# Patient Record
Sex: FEMALE | Race: WHITE | NOT HISPANIC OR LATINO | Employment: FULL TIME | URBAN - METROPOLITAN AREA
[De-identification: names, ages, dates, MRNs, and addresses within clinical notes are randomized per-mention and may not be internally consistent; named-entity substitution may affect disease eponyms.]

---

## 2023-07-23 ENCOUNTER — HOSPITAL ENCOUNTER (INPATIENT)
Facility: HOSPITAL | Age: 38
LOS: 1 days | Discharge: HOME | End: 2023-07-24
Attending: FAMILY MEDICINE | Admitting: INTERNAL MEDICINE
Payer: COMMERCIAL

## 2023-07-23 ENCOUNTER — APPOINTMENT (EMERGENCY)
Dept: RADIOLOGY | Facility: HOSPITAL | Age: 38
End: 2023-07-23
Payer: COMMERCIAL

## 2023-07-23 DIAGNOSIS — R79.89 LOW TSH LEVEL: ICD-10-CM

## 2023-07-23 DIAGNOSIS — R00.2 PALPITATION: ICD-10-CM

## 2023-07-23 DIAGNOSIS — I49.3 VENTRICULAR ECTOPY: Primary | ICD-10-CM

## 2023-07-23 DIAGNOSIS — I10 HYPERTENSION: ICD-10-CM

## 2023-07-23 PROBLEM — I47.29 NSVT (NONSUSTAINED VENTRICULAR TACHYCARDIA) (HCC): Status: ACTIVE | Noted: 2023-07-23

## 2023-07-23 LAB
2HR DELTA HS TROPONIN: 0 NG/L
4HR DELTA HS TROPONIN: 1 NG/L
ANION GAP SERPL CALCULATED.3IONS-SCNC: 9 MMOL/L
ATRIAL RATE: 95 BPM
BASOPHILS # BLD AUTO: 0.01 THOUSANDS/ÂΜL (ref 0–0.1)
BASOPHILS NFR BLD AUTO: 0 % (ref 0–1)
BUN SERPL-MCNC: 7 MG/DL (ref 5–25)
CALCIUM SERPL-MCNC: 9.4 MG/DL (ref 8.4–10.2)
CARDIAC TROPONIN I PNL SERPL HS: 4 NG/L
CARDIAC TROPONIN I PNL SERPL HS: 4 NG/L
CARDIAC TROPONIN I PNL SERPL HS: 5 NG/L
CHLORIDE SERPL-SCNC: 104 MMOL/L (ref 96–108)
CO2 SERPL-SCNC: 23 MMOL/L (ref 21–32)
CREAT SERPL-MCNC: 0.49 MG/DL (ref 0.6–1.3)
D DIMER PPP FEU-MCNC: <0.27 UG/ML FEU
EOSINOPHIL # BLD AUTO: 0.03 THOUSAND/ÂΜL (ref 0–0.61)
EOSINOPHIL NFR BLD AUTO: 0 % (ref 0–6)
ERYTHROCYTE [DISTWIDTH] IN BLOOD BY AUTOMATED COUNT: 12.5 % (ref 11.6–15.1)
GFR SERPL CREATININE-BSD FRML MDRD: 123 ML/MIN/1.73SQ M
GLUCOSE SERPL-MCNC: 120 MG/DL (ref 65–140)
HCT VFR BLD AUTO: 43.8 % (ref 34.8–46.1)
HGB BLD-MCNC: 15 G/DL (ref 11.5–15.4)
IMM GRANULOCYTES # BLD AUTO: 0.01 THOUSAND/UL (ref 0–0.2)
IMM GRANULOCYTES NFR BLD AUTO: 0 % (ref 0–2)
LYMPHOCYTES # BLD AUTO: 1.86 THOUSANDS/ÂΜL (ref 0.6–4.47)
LYMPHOCYTES NFR BLD AUTO: 24 % (ref 14–44)
MAGNESIUM SERPL-MCNC: 2 MG/DL (ref 1.9–2.7)
MCH RBC QN AUTO: 25.8 PG (ref 26.8–34.3)
MCHC RBC AUTO-ENTMCNC: 34.2 G/DL (ref 31.4–37.4)
MCV RBC AUTO: 75 FL (ref 82–98)
MONOCYTES # BLD AUTO: 0.51 THOUSAND/ÂΜL (ref 0.17–1.22)
MONOCYTES NFR BLD AUTO: 7 % (ref 4–12)
NEUTROPHILS # BLD AUTO: 5.3 THOUSANDS/ÂΜL (ref 1.85–7.62)
NEUTS SEG NFR BLD AUTO: 69 % (ref 43–75)
NRBC BLD AUTO-RTO: 0 /100 WBCS
P AXIS: 68 DEGREES
PLATELET # BLD AUTO: 268 THOUSANDS/UL (ref 149–390)
PMV BLD AUTO: 9.4 FL (ref 8.9–12.7)
POTASSIUM SERPL-SCNC: 3.8 MMOL/L (ref 3.5–5.3)
PR INTERVAL: 150 MS
QRS AXIS: 86 DEGREES
QRSD INTERVAL: 96 MS
QT INTERVAL: 346 MS
QTC INTERVAL: 434 MS
RBC # BLD AUTO: 5.81 MILLION/UL (ref 3.81–5.12)
SODIUM SERPL-SCNC: 136 MMOL/L (ref 135–147)
T WAVE AXIS: 55 DEGREES
TSH SERPL DL<=0.05 MIU/L-ACNC: <0.01 UIU/ML (ref 0.45–4.5)
VENTRICULAR RATE: 95 BPM
WBC # BLD AUTO: 7.72 THOUSAND/UL (ref 4.31–10.16)

## 2023-07-23 PROCEDURE — 99284 EMERGENCY DEPT VISIT MOD MDM: CPT

## 2023-07-23 PROCEDURE — 99254 IP/OBS CNSLTJ NEW/EST MOD 60: CPT | Performed by: PHYSICIAN ASSISTANT

## 2023-07-23 PROCEDURE — 93005 ELECTROCARDIOGRAM TRACING: CPT

## 2023-07-23 PROCEDURE — 99223 1ST HOSP IP/OBS HIGH 75: CPT | Performed by: INTERNAL MEDICINE

## 2023-07-23 PROCEDURE — 96361 HYDRATE IV INFUSION ADD-ON: CPT

## 2023-07-23 PROCEDURE — 93010 ELECTROCARDIOGRAM REPORT: CPT | Performed by: INTERNAL MEDICINE

## 2023-07-23 PROCEDURE — 84443 ASSAY THYROID STIM HORMONE: CPT | Performed by: FAMILY MEDICINE

## 2023-07-23 PROCEDURE — 85025 COMPLETE CBC W/AUTO DIFF WBC: CPT | Performed by: FAMILY MEDICINE

## 2023-07-23 PROCEDURE — 71045 X-RAY EXAM CHEST 1 VIEW: CPT

## 2023-07-23 PROCEDURE — 36415 COLL VENOUS BLD VENIPUNCTURE: CPT | Performed by: FAMILY MEDICINE

## 2023-07-23 PROCEDURE — 96376 TX/PRO/DX INJ SAME DRUG ADON: CPT

## 2023-07-23 PROCEDURE — 85379 FIBRIN DEGRADATION QUANT: CPT | Performed by: FAMILY MEDICINE

## 2023-07-23 PROCEDURE — 99291 CRITICAL CARE FIRST HOUR: CPT | Performed by: FAMILY MEDICINE

## 2023-07-23 PROCEDURE — 96374 THER/PROPH/DIAG INJ IV PUSH: CPT

## 2023-07-23 PROCEDURE — 84484 ASSAY OF TROPONIN QUANT: CPT | Performed by: FAMILY MEDICINE

## 2023-07-23 PROCEDURE — 80048 BASIC METABOLIC PNL TOTAL CA: CPT | Performed by: FAMILY MEDICINE

## 2023-07-23 PROCEDURE — 83735 ASSAY OF MAGNESIUM: CPT | Performed by: FAMILY MEDICINE

## 2023-07-23 PROCEDURE — 84439 ASSAY OF FREE THYROXINE: CPT | Performed by: INTERNAL MEDICINE

## 2023-07-23 RX ORDER — ONDANSETRON 2 MG/ML
4 INJECTION INTRAMUSCULAR; INTRAVENOUS EVERY 6 HOURS PRN
Status: DISCONTINUED | OUTPATIENT
Start: 2023-07-23 | End: 2023-07-24 | Stop reason: HOSPADM

## 2023-07-23 RX ORDER — METOPROLOL TARTRATE 5 MG/5ML
5 INJECTION INTRAVENOUS EVERY 6 HOURS
Status: DISCONTINUED | OUTPATIENT
Start: 2023-07-23 | End: 2023-07-24

## 2023-07-23 RX ORDER — METOPROLOL TARTRATE 5 MG/5ML
5 INJECTION INTRAVENOUS
Status: COMPLETED | OUTPATIENT
Start: 2023-07-23 | End: 2023-07-23

## 2023-07-23 RX ORDER — ACETAMINOPHEN 325 MG/1
650 TABLET ORAL EVERY 6 HOURS PRN
Status: DISCONTINUED | OUTPATIENT
Start: 2023-07-23 | End: 2023-07-24 | Stop reason: HOSPADM

## 2023-07-23 RX ADMIN — METOROPROLOL TARTRATE 5 MG: 5 INJECTION, SOLUTION INTRAVENOUS at 22:09

## 2023-07-23 RX ADMIN — SODIUM CHLORIDE 1000 ML: 0.9 INJECTION, SOLUTION INTRAVENOUS at 10:34

## 2023-07-23 RX ADMIN — METOROPROLOL TARTRATE 5 MG: 5 INJECTION, SOLUTION INTRAVENOUS at 17:10

## 2023-07-23 RX ADMIN — METOPROLOL TARTRATE 5 MG: 5 INJECTION INTRAVENOUS at 10:41

## 2023-07-23 RX ADMIN — METOPROLOL TARTRATE 5 MG: 5 INJECTION INTRAVENOUS at 10:56

## 2023-07-23 RX ADMIN — METOPROLOL TARTRATE 5 MG: 5 INJECTION INTRAVENOUS at 11:17

## 2023-07-23 NOTE — PLAN OF CARE
Problem: CARDIOVASCULAR - ADULT  Goal: Maintains optimal cardiac output and hemodynamic stability  Description: INTERVENTIONS:  - Monitor I/O, vital signs and rhythm  - Monitor for S/S and trends of decreased cardiac output  - Administer and titrate ordered vasoactive medications to optimize hemodynamic stability  - Assess quality of pulses, skin color and temperature  - Assess for signs of decreased coronary artery perfusion  - Instruct patient to report change in severity of symptoms  Outcome: Progressing  Goal: Absence of cardiac dysrhythmias or at baseline rhythm  Description: INTERVENTIONS:  - Continuous cardiac monitoring, vital signs, obtain 12 lead EKG if ordered  - Administer antiarrhythmic and heart rate control medications as ordered  - Monitor electrolytes and administer replacement therapy as ordered  Outcome: Progressing     Problem: DISCHARGE PLANNING  Goal: Discharge to home or other facility with appropriate resources  Description: INTERVENTIONS:  - Identify barriers to discharge w/patient and caregiver  - Arrange for needed discharge resources and transportation as appropriate  - Identify discharge learning needs (meds, wound care, etc.)  - Arrange for interpretive services to assist at discharge as needed  - Refer to Case Management Department for coordinating discharge planning if the patient needs post-hospital services based on physician/advanced practitioner order or complex needs related to functional status, cognitive ability, or social support system  Outcome: Progressing     Problem: Knowledge Deficit  Goal: Patient/family/caregiver demonstrates understanding of disease process, treatment plan, medications, and discharge instructions  Description: Complete learning assessment and assess knowledge base.   Interventions:  - Provide teaching at level of understanding  - Provide teaching via preferred learning methods  Outcome: Progressing

## 2023-07-23 NOTE — H&P
[address]   H&P  Name: Moises Oliveira 43 y.o. female I MRN: [id number]  Unit/Bed#: -[facility] I Date of Admission: 7/23/2023   Date of Service: 7/23/2023 I Hospital Day: 0      Assessment/Plan   * NSVT (nonsustained ventricular tachycardia) ([address])  Assessment & Plan  · Patient with no cardiac history, NSVT noted on telemetry in the ER  · Also with ventricular ectopy/PVCs  · TSH level low, will follow-up free T4  · Magnesium level 2  · Cardiology input appreciated  · IV Lopressor initiated  · Patient denies any drug use  · Will check echo  · Follow-up with cardiology    Low TSH level  Assessment & Plan  · Free T4 pending  · Patient with no history of any thyroid disorders         VTE Prophylaxis: Low risk, encourage ambulation  Code Status: Full code  Discussion with family: Updated patient regarding plan of care    Anticipated Length of Stay:  Patient will be admitted on an Inpatient basis with an anticipated length of stay of  > 2 midnights. Justification for Hospital Stay: NSVT    Chief Complaint:   Anxiety    History of Present Illness:    Moises Oliveira is a 43 y.o. female who presents with panic attack patient states that she went to urgent care. Due to discomfort in her throat and was diagnosed with tracheitis. While at home last night patient became anxious about her diagnosis and began to look up information on the Internet which caused her to be more anxious and patient states she had a panic attack. Denies any chest pain or shortness of breath however did feel uneasy and symptoms continued through the night and patient presented this morning for further evaluation. Patient denies any alcohol or drug abuse. Does not take any medications. No fever or chills. No nausea or vomiting. Denies any recent changes in life that would lead to stress  In the ER patient noted to have NSVT and PVCs.   Case reviewed with cardiology who recommended admission for echo/stress test.  Metoprolol initiated in the ER by cardiology    Review of Systems:    Review of Systems   Constitutional: Positive for fatigue. Negative for chills and fever. Respiratory: Positive for cough. Cardiovascular: Positive for palpitations. Negative for chest pain. Psychiatric/Behavioral: The patient is nervous/anxious. All other systems reviewed and are negative. Past Medical and Surgical History:     History reviewed. No pertinent past medical history. Past Surgical History:   Procedure Laterality Date   •  SECTION         Meds/Allergies:    Prior to Admission medications    Not on File     all medications and allergies reviewed    Allergies: No Known Allergies    Social History:     Marital Status: Single   Patient Pre-hospital Living Situation: Lives with family  Patient Pre-hospital Level of Mobility: Ambulatory  Patient Pre-hospital Diet Restrictions: None  Substance Use History:   Social History     Substance and Sexual Activity   Alcohol Use None     Social History     Tobacco Use   Smoking Status Never   • Passive exposure: Never   Smokeless Tobacco Never     Social History     Substance and Sexual Activity   Drug Use Not on file       Family History:  I have reviewed the patient's family history    Physical Exam:     Vitals:   Blood Pressure: 145/98 (23 1317)  Pulse: 87 (23 1317)  Temperature: 98.3 °F (36.8 °C) (23 1317)  Temp Source: Tympanic (23 0751)  Respirations: 20 (23 1317)  Height: 5' 9" (175.3 cm) (23 1300)  Weight - Scale: 90.1 kg (198 lb 10.9 oz) (23 1300)  SpO2: 97 % (23 1317)    Physical Exam  Constitutional:       General: She is not in acute distress. HENT:      Head: Normocephalic and atraumatic. Nose: Nose normal.      Mouth/Throat:      Mouth: Mucous membranes are moist.   Eyes:      Extraocular Movements: Extraocular movements intact.       Conjunctiva/sclera: Conjunctivae normal.   Cardiovascular:      Rate and Rhythm: Normal rate and regular rhythm. Pulmonary:      Effort: Pulmonary effort is normal. No respiratory distress. Abdominal:      Palpations: Abdomen is soft. Tenderness: There is no abdominal tenderness. Musculoskeletal:         General: Normal range of motion. Cervical back: Normal range of motion and neck supple. Skin:     General: Skin is warm and dry. Neurological:      General: No focal deficit present. Mental Status: She is alert. Mental status is at baseline. Cranial Nerves: No cranial nerve deficit. Psychiatric:         Mood and Affect: Mood normal.         Behavior: Behavior normal.         Additional Data:     Lab Results: I have personally reviewed pertinent reports. Results from last 7 days   Lab Units 07/23/23  0845   WBC Thousand/uL 7.72   HEMOGLOBIN g/dL 15.0   HEMATOCRIT % 43.8   PLATELETS Thousands/uL 268   NEUTROS PCT % 69   LYMPHS PCT % 24   MONOS PCT % 7   EOS PCT % 0     Results from last 7 days   Lab Units 07/23/23  0845   SODIUM mmol/L 136   POTASSIUM mmol/L 3.8   CHLORIDE mmol/L 104   CO2 mmol/L 23   BUN mg/dL 7   CREATININE mg/dL 0.49*   ANION GAP mmol/L 9   CALCIUM mg/dL 9.4   GLUCOSE RANDOM mg/dL 120                       Imaging: I have personally reviewed pertinent reports. XR chest 1 view portable   Final Result by Marine Koyanagi, MD (07/23 1203)      No acute cardiopulmonary disease. Workstation performed: [id number]           [facility] / [facility] Everywhere Records Reviewed: Yes    ** Please Note: This note has been constructed using a voice recognition system.  **

## 2023-07-23 NOTE — ASSESSMENT & PLAN NOTE
Presented with anxious feeling and palpitations  Noted to have frequent V-ectopy on telemetry in the ED  Lopressor is started to control   Will admit patient to monitor on telemetry   Will check an echo to assess cardiac structures and LV function

## 2023-07-23 NOTE — ASSESSMENT & PLAN NOTE
· Patient with no cardiac history, NSVT noted on telemetry in the ER  · Also with ventricular ectopy/PVCs  · TSH level low, will follow-up free T4  · Magnesium level 2  · Cardiology input appreciated  · IV Lopressor initiated  · Patient denies any drug use  · Will check echo  · Follow-up with cardiology

## 2023-07-23 NOTE — PLAN OF CARE
Problem: PAIN - ADULT  Goal: Verbalizes/displays adequate comfort level or baseline comfort level  Description: Interventions:  - Encourage patient to monitor pain and request assistance  - Assess pain using appropriate pain scale  - Administer analgesics based on type and severity of pain and evaluate response  - Implement non-pharmacological measures as appropriate and evaluate response  - Consider cultural and social influences on pain and pain management  - Notify physician/advanced practitioner if interventions unsuccessful or patient reports new pain  Outcome: Progressing     Problem: SAFETY ADULT  Goal: Patient will remain free of falls  Description: INTERVENTIONS:  - Educate patient/family on patient safety including physical limitations  - Instruct patient to call for assistance with activity   - Consult OT/PT to assist with strengthening/mobility   - Keep Call bell within reach  - Keep bed low and locked with side rails adjusted as appropriate  - Keep care items and personal belongings within reach  - Initiate and maintain comfort rounds  - Make Fall Risk Sign visible to staff  - Offer Toileting every 2 Hours, in advance of need  - Initiate/Maintain bed alarm  - Obtain necessary fall risk management equipment: 2  - Apply yellow socks and bracelet for high fall risk patients  - Consider moving patient to room near nurses station  Outcome: Progressing  Goal: Maintain or return to baseline ADL function  Description: INTERVENTIONS:  -  Assess patient's ability to carry out ADLs; assess patient's baseline for ADL function and identify physical deficits which impact ability to perform ADLs (bathing, care of mouth/teeth, toileting, grooming, dressing, etc.)  - Assess/evaluate cause of self-care deficits   - Assess range of motion  - Assess patient's mobility; develop plan if impaired  - Assess patient's need for assistive devices and provide as appropriate  - Encourage maximum independence but intervene and supervise when necessary  - Involve family in performance of ADLs  - Assess for home care needs following discharge   - Consider OT consult to assist with ADL evaluation and planning for discharge  - Provide patient education as appropriate  Outcome: Progressing  Goal: Maintains/Returns to pre admission functional level  Description: INTERVENTIONS:  - Perform BMAT or MOVE assessment daily.   - Set and communicate daily mobility goal to care team and patient/family/caregiver. - Collaborate with rehabilitation services on mobility goals if consulted  - Perform Range of Motion 2 times a day. - Reposition patient every 2 hours. - Dangle patient 2 times a day  - Stand patient 2 times a day  - Ambulate patient 2 times a day  - Out of bed to chair 2 times a day   - Out of bed for meals 2 times a day  - Out of bed for toileting  - Record patient progress and toleration of activity level   Outcome: Progressing     Problem: DISCHARGE PLANNING  Goal: Discharge to home or other facility with appropriate resources  Description: INTERVENTIONS:  - Identify barriers to discharge w/patient and caregiver  - Arrange for needed discharge resources and transportation as appropriate  - Identify discharge learning needs (meds, wound care, etc.)  - Arrange for interpretive services to assist at discharge as needed  - Refer to Case Management Department for coordinating discharge planning if the patient needs post-hospital services based on physician/advanced practitioner order or complex needs related to functional status, cognitive ability, or social support system  Outcome: Progressing     Problem: Knowledge Deficit  Goal: Patient/family/caregiver demonstrates understanding of disease process, treatment plan, medications, and discharge instructions  Description: Complete learning assessment and assess knowledge base.   Interventions:  - Provide teaching at level of understanding  - Provide teaching via preferred learning methods  Outcome: Progressing     Problem: CARDIOVASCULAR - ADULT  Goal: Maintains optimal cardiac output and hemodynamic stability  Description: INTERVENTIONS:  - Monitor I/O, vital signs and rhythm  - Monitor for S/S and trends of decreased cardiac output  - Administer and titrate ordered vasoactive medications to optimize hemodynamic stability  - Assess quality of pulses, skin color and temperature  - Assess for signs of decreased coronary artery perfusion  - Instruct patient to report change in severity of symptoms  Outcome: Progressing  Goal: Absence of cardiac dysrhythmias or at baseline rhythm  Description: INTERVENTIONS:  - Continuous cardiac monitoring, vital signs, obtain 12 lead EKG if ordered  - Administer antiarrhythmic and heart rate control medications as ordered  - Monitor electrolytes and administer replacement therapy as ordered  Outcome: Progressing

## 2023-07-23 NOTE — ED PROVIDER NOTES
History  Chief Complaint   Patient presents with   • Medical Problem     Patient reporting anxiety and panic attacks over night. HPI  59-year-old female presented with the panic attack. Patient states that she was diagnosed with a tracheitis and started having panic attack thinking that she was going to die. Patient states the her she started having palpitation became really anxious so came to the ED with panic attack. Patient states she does not want any medication at this time she just wants to get checked. Patient was found to have elevated heart rate initially with elevated blood pressure. She denies any chest pain shortness of breath she is extremely anxious in the ED. None       History reviewed. No pertinent past medical history. Past Surgical History:   Procedure Laterality Date   •  SECTION         History reviewed. No pertinent family history. I have reviewed and agree with the history as documented. E-Cigarette/Vaping   • E-Cigarette Use Never User      E-Cigarette/Vaping Substances   • Nicotine No    • THC No    • CBD No    • Flavoring No    • Other No    • Unknown No      Social History     Tobacco Use   • Smoking status: Never     Passive exposure: Never   • Smokeless tobacco: Never   Vaping Use   • Vaping Use: Never used   Substance Use Topics   • Alcohol use: Never   • Drug use: Never       Review of Systems   Constitutional: Negative. HENT: Negative. Eyes: Negative. Respiratory: Negative. Cardiovascular: Negative. Gastrointestinal: Negative. Endocrine: Negative. Genitourinary: Negative. Musculoskeletal: Negative. Skin: Negative. Allergic/Immunologic: Negative. Neurological: Negative. Hematological: Negative. Psychiatric/Behavioral: The patient is nervous/anxious. Physical Exam  Physical Exam  Vitals and nursing note reviewed. Constitutional:       Appearance: She is well-developed.    HENT:      Head: Normocephalic and atraumatic. Right Ear: External ear normal.      Left Ear: External ear normal.      Nose: Nose normal.      Mouth/Throat:      Mouth: Mucous membranes are moist.      Pharynx: No oropharyngeal exudate. Eyes:      General: No scleral icterus. Right eye: No discharge. Left eye: No discharge. Conjunctiva/sclera: Conjunctivae normal.      Pupils: Pupils are equal, round, and reactive to light. Cardiovascular:      Rate and Rhythm: Normal rate and regular rhythm. Pulses: Normal pulses. Heart sounds: Normal heart sounds. Pulmonary:      Effort: Pulmonary effort is normal. No respiratory distress. Breath sounds: Normal breath sounds. No wheezing. Abdominal:      General: Bowel sounds are normal.      Palpations: Abdomen is soft. Musculoskeletal:         General: Normal range of motion. Cervical back: Normal range of motion and neck supple. Lymphadenopathy:      Cervical: No cervical adenopathy. Skin:     General: Skin is warm and dry. Capillary Refill: Capillary refill takes less than 2 seconds. Neurological:      General: No focal deficit present. Mental Status: She is alert and oriented to person, place, and time. Psychiatric:         Mood and Affect: Mood is anxious. Behavior: Behavior is agitated. Thought Content: Thought content is not paranoid or delusional. Thought content does not include homicidal or suicidal ideation. Thought content does not include homicidal or suicidal plan.          Vital Signs  ED Triage Vitals [07/23/23 0751]   Temperature Pulse Respirations Blood Pressure SpO2   (!) 97.3 °F (36.3 °C) (!) 120 16 167/77 98 %      Temp Source Heart Rate Source Patient Position - Orthostatic VS BP Location FiO2 (%)   Tympanic Monitor Sitting Left arm --      Pain Score       No Pain           Vitals:    07/23/23 1200 07/23/23 1215 07/23/23 1230 07/23/23 1317   BP: 164/73 152/65 152/69 145/98   Pulse: 85 94 83 87   Patient Position - Orthostatic VS:             Visual Acuity      ED Medications  Medications   metoprolol (LOPRESSOR) injection 5 mg (has no administration in time range)   acetaminophen (TYLENOL) tablet 650 mg (has no administration in time range)   ondansetron (ZOFRAN) injection 4 mg (has no administration in time range)   sodium chloride 0.9 % bolus 1,000 mL (0 mL Intravenous Stopped 7/23/23 1120)   metoprolol (LOPRESSOR) injection 5 mg (5 mg Intravenous Given 7/23/23 1117)       Diagnostic Studies  Results Reviewed     Procedure Component Value Units Date/Time    HS Troponin I 4hr [[id number]]  (Normal) Collected: 07/23/23 1255    Lab Status: Final result Specimen: Blood from Arm, Right Updated: 07/23/23 1326     hs TnI 4hr 5 ng/L      Delta 4hr hsTnI 1 ng/L     T4, free [[id number]] Collected: 07/23/23 0845    Lab Status:  In process Specimen: Blood from Arm, Right Updated: 07/23/23 1259    TSH [[id number]]  (Abnormal) Collected: 07/23/23 0845    Lab Status: Final result Specimen: Blood from Arm, Right Updated: 07/23/23 1158     TSH 3RD GENERATON <0.010 uIU/mL     HS Troponin I 2hr [[id number]]  (Normal) Collected: 07/23/23 1117    Lab Status: Final result Specimen: Blood from Arm, Right Updated: 07/23/23 1154     hs TnI 2hr 4 ng/L      Delta 2hr hsTnI 0 ng/L     Magnesium [[id number]]  (Normal) Collected: 07/23/23 0845    Lab Status: Final result Specimen: Blood from Arm, Right Updated: 07/23/23 1120     Magnesium 2.0 mg/dL     HS Troponin 0hr (reflex protocol) [[id number]]  (Normal) Collected: 07/23/23 0845    Lab Status: Final result Specimen: Blood from Arm, Right Updated: 07/23/23 0918     hs TnI 0hr 4 ng/L     D-Dimer [[id number]]  (Normal) Collected: 07/23/23 0845    Lab Status: Final result Specimen: Blood from Arm, Right Updated: 07/23/23 0913     D-Dimer, Quant <0.27 ug/ml FEU     Basic metabolic panel [[id number]]  (Abnormal) Collected: 07/23/23 7259    Lab Status: Final result Specimen: Blood from Arm, Right Updated: 07/23/23 0913     Sodium 136 mmol/L      Potassium 3.8 mmol/L      Chloride 104 mmol/L      CO2 23 mmol/L      ANION GAP 9 mmol/L      BUN 7 mg/dL      Creatinine 0.49 mg/dL      Glucose 120 mg/dL      Calcium 9.4 mg/dL      eGFR 123 ml/min/1.73sq m     Narrative:      [facility] guidelines for Chronic Kidney Disease (CKD):   •  Stage 1 with normal or high GFR (GFR > 90 mL/min/1.73 square meters)  •  Stage 2 Mild CKD (GFR = 60-89 mL/min/1.73 square meters)  •  Stage 3A Moderate CKD (GFR = 45-59 mL/min/1.73 square meters)  •  Stage 3B Moderate CKD (GFR = 30-44 mL/min/1.73 square meters)  •  Stage 4 Severe CKD (GFR = 15-29 mL/min/1.73 square meters)  •  Stage 5 End Stage CKD (GFR <15 mL/min/1.73 square meters)  Note: GFR calculation is accurate only with a steady state creatinine    CBC and differential [[id number]]  (Abnormal) Collected: 07/23/23 0845    Lab Status: Final result Specimen: Blood from Arm, Right Updated: 07/23/23 0901     WBC 7.72 Thousand/uL      RBC 5.81 Million/uL      Hemoglobin 15.0 g/dL      Hematocrit 43.8 %      MCV 75 fL      MCH 25.8 pg      MCHC 34.2 g/dL      RDW 12.5 %      MPV 9.4 fL      Platelets 026 Thousands/uL      nRBC 0 /100 WBCs      Neutrophils Relative 69 %      Immat GRANS % 0 %      Lymphocytes Relative 24 %      Monocytes Relative 7 %      Eosinophils Relative 0 %      Basophils Relative 0 %      Neutrophils Absolute 5.30 Thousands/µL      Immature Grans Absolute 0.01 Thousand/uL      Lymphocytes Absolute 1.86 Thousands/µL      Monocytes Absolute 0.51 Thousand/µL      Eosinophils Absolute 0.03 Thousand/µL      Basophils Absolute 0.01 Thousands/µL                  XR chest 1 view portable   Final Result by Alexey Alejandro MD (07/23 1203)      No acute cardiopulmonary disease.                Workstation performed: [id number]                    Procedures  CriticalCare Time    Date/Time: 7/23/2023 4:41 PM    Performed by: Ezequiel Hayes, MD  Authorized by: Judi Mckenzie MD    Critical care provider statement:     Critical care time (minutes):  75    Critical care start time:  7/23/2023 8:07 AM    Critical care end time:  7/23/2023 12:30 PM    Critical care time was exclusive of:  Separately billable procedures and treating other patients and teaching time    Critical care was necessary to treat or prevent imminent or life-threatening deterioration of the following conditions:  Cardiac failure    Critical care was time spent personally by me on the following activities:  Blood draw for specimens, obtaining history from patient or surrogate, development of treatment plan with patient or surrogate, discussions with consultants, discussions with primary provider, evaluation of patient's response to treatment, examination of patient, review of old charts, re-evaluation of patient's condition, ordering and review of radiographic studies, ordering and review of laboratory studies, ordering and performing treatments and interventions and interpretation of cardiac output measurements    I assumed direction of critical care for this patient from another provider in my specialty: no               ED Course  ED Course as of 07/23/23 1644   Sun Jul 23, 2023   0927 D-Dimer, Quant: <0.27  Negative dimer. 7445 hs TnI 0hr: 4  Discussed with patient and repeat 2-hour troponin patient does not have any chest pain or shortness of breath. 1000 Patient is is denying chest pain shortness of breath. States feeling much better. Multiple run of V. tach this was sent to cardiology team Elizabeth Vaca) on call. Waiting for callback. Medical Decision Making  39-year-old female presented to the ED with the complaint of a panic attack. Recent diagnosed with tracheitis which she states is improving. History is taken from patient and his significant other who is by bedside.   Given the patient current status will obtain lab including CBC BMP troponin D-dimer. Low suspicion for MI/PE/NSTEMI/pneumonia/arrhythmia patient was offered medication for anxiety which she refused states the I do not want any medication my ex  from the abusing drugs. Labs reviewed x-rays reviewed patient had multiple runs of ventricular tachycardia consulted to cardiology. Troponin admitted for repeat 2-hour troponin. Recommend to start patient on Lopressor admit we will obtain magnesium and TSH. Case discussed with the internal medicine team the allergy team will discuss his case with EP. Disposition admit to internal medicine team for further observation. Amount and/or Complexity of Data Reviewed  Labs: ordered. Decision-making details documented in ED Course. Radiology: ordered. Risk  Decision regarding hospitalization. Disposition  Final diagnoses:   Ventricular ectopy   Palpitation   Hypertension     Time reflects when diagnosis was documented in both MDM as applicable and the Disposition within this note     Time User Action Codes Description Comment    2023 12:24 PM Asad Arevalo Add [I49.3] Ventricular ectopy     2023 12:29 PM Asad Arevalo Add [R00.2] Palpitation     2023 12:30 PM Asad Arevalo Add [I10] Hypertension       ED Disposition     ED Disposition   Admit    Condition   Stable    Date/Time   Sun 2023 12:29 PM    Comment   Case was discussed with  Dr. Sandra Manley  and the patient's admission status was agreed to be Admission Status: inpatient status to the service of Dr. Sandra Manley . Follow-up Information    None         There are no discharge medications for this patient. No discharge procedures on file.     PDMP Review     None          ED Provider  Electronically Signed by           Asad Arevalo MD  23 [id number]

## 2023-07-23 NOTE — CONSULTS
[address]  Consult  Name: Fernando Elias 43 y.o. female I MRN: [id number]  Unit/Bed#: ED 27 I Date of Admission: 7/23/2023   Date of Service: 7/23/2023 I Hospital Day: 0    Consult to cardiology  Consult performed by: Sona Clayton PA-C  Consult ordered by: Cassie Johnson MD          Assessment/Plan   NSVT (nonsustained ventricular tachycardia) [facility])  Assessment & Plan  Multiple recurring episodes of NSVT noted on telemetry in the ED  Lopressor started to control   See additional comments  Monitor on telemetry   Will consult with EP to discuss further treatment options       Ventricular ectopy  Assessment & Plan  Presented with anxious feeling and palpitations  Noted to have frequent V-ectopy on telemetry in the ED  Lopressor is started to control   Will admit patient to monitor on telemetry   Will check an echo to assess cardiac structures and LV function          Summary Comments:  Recurrent ventricular ectopy and NSVT noted on telemetry. Lopressor will be administered to control. Continue monitoring on telemetry. Echocardiogram will be checked to assess cardiac structures and LV function. Thank you for allowing us to see this pleasant patient in consult. We will follow course along with you and make outpatient plans outlined above with all arrangements. Outpatient Cardiologist: None    Chief Complaint:  Chief Complaint   Patient presents with   • Medical Problem     Patient reporting anxiety and panic attacks over night. History of Present Illness: The patient is a 43-year-old female who has no significant past medical history. She comes into the emergency room complaining of "feeling anxious". The patient states that she had "one of her anxiety attacks". She noted that her heart was racing and pounding and she was not feeling right she came to the emergency room for further evaluation and treatment.   On telemetry she was noted to have frequent ventricular ectopy and recurrent NSVT. We were asked to comment with regard. EKG shows normal sinus rhythm with frequent PVCs. Review of Systems: a 10 point review of systems was conducted and is negative except for as mentioned in the HPI or as below. Review of Systems   Constitutional: Negative. HENT: Negative. Eyes: Negative. Cardiovascular: Positive for palpitations. Negative for chest pain, claudication, cyanosis, dyspnea on exertion, irregular heartbeat, leg swelling, near-syncope, orthopnea, paroxysmal nocturnal dyspnea and syncope. Respiratory: Negative. Negative for cough, hemoptysis, shortness of breath, sleep disturbances due to breathing, snoring, sputum production and wheezing. Endocrine: Negative. Hematologic/Lymphatic: Negative. Skin: Negative. Musculoskeletal: Negative. Gastrointestinal: Negative. Genitourinary: Negative. Neurological: Negative. Psychiatric/Behavioral: The patient is nervous/anxious. Allergic/Immunologic: Negative. Past Medical and Surgical History:  History reviewed. No pertinent past medical history. Past Surgical History:   Procedure Laterality Date   •  SECTION       Social History     Substance and Sexual Activity   Alcohol Use None     Social History     Substance and Sexual Activity   Drug Use Not on file     Social History     Tobacco Use   Smoking Status Never   • Passive exposure: Never   Smokeless Tobacco Never       Family History:  History reviewed. No pertinent family history. Medication:    Current Facility-Administered Medications:   •  metoprolol (LOPRESSOR) injection 5 mg, 5 mg, Intravenous, Q6H, Louis Singh PA-C  No current outpatient medications on file. Allergies:  No Known Allergies    Physical Exam:  Vitals: Blood pressure 153/73, pulse 83, temperature (!) 97.3 °F (36.3 °C), temperature source Tympanic, resp. rate (!) 23, SpO2 97 %. , There is no height or weight on file to calculate BMI.,   Orthostatic Blood Pressures    Flowsheet Row Most Recent Value   Blood Pressure 153/73 filed at 07/23/2023 1130   Patient Position - Orthostatic VS Sitting filed at 07/23/2023 4516      , Systolic (66KOH), LMA:273 , Min:153 , XYY:908   , Diastolic (62SVG), JPN:77, Min:73, Max:88    Physical Exam  Vitals and nursing note reviewed. Constitutional:       Appearance: She is well-developed. HENT:      Head: Normocephalic and atraumatic. Mouth/Throat:      Mouth: Mucous membranes are moist.   Eyes:      General: No scleral icterus. Conjunctiva/sclera: Conjunctivae normal.   Neck:      Vascular: No JVD. Trachea: No tracheal deviation. Cardiovascular:      Rate and Rhythm: Normal rate. Rhythm irregular. Pulses: Intact distal pulses. Heart sounds: Normal heart sounds, S1 normal and S2 normal. No murmur heard. No friction rub. No gallop. Pulmonary:      Effort: Pulmonary effort is normal. No respiratory distress. Breath sounds: Normal breath sounds. No wheezing or rales. Chest:      Chest wall: No tenderness. Abdominal:      General: Bowel sounds are normal. There is no distension. Palpations: Abdomen is soft. Tenderness: There is no abdominal tenderness. Comments: Aorta not palpable    Musculoskeletal:         General: No tenderness. Normal range of motion. Cervical back: Normal range of motion and neck supple. Right lower leg: No edema. Left lower leg: No edema. Skin:     General: Skin is warm and dry. Coloration: Skin is not pale. Findings: No erythema or rash. Neurological:      General: No focal deficit present. Mental Status: She is alert and oriented to person, place, and time.    Psychiatric:         Mood and Affect: Mood normal.         Behavior: Behavior normal.         Judgment: Judgment normal.           Most Recent Cardiac Imaging:   Echo is ordered    EKG/Telemetry: Sinus tachycardia with frequent ventricular ectopy with runs of NSVT    7/23/2023 sinus rhythm with frequent PVCs    Lab Results:   Troponins:   Results from last 7 days   Lab Units 07/23/23  1117 07/23/23  0845   HS TNI 0HR ng/L  --  4   HS TNI 2HR ng/L 4  --    HSTNI D2 ng/L 0  --       BNP:    CBC with diff:   Results from last 7 days   Lab Units 07/23/23  0845   WBC Thousand/uL 7.72   HEMOGLOBIN g/dL 15.0   HEMATOCRIT % 43.8   PLATELETS Thousands/uL 268   RBC Million/uL 5.81*     CMP:  Results from last 7 days   Lab Units 07/23/23  0845   POTASSIUM mmol/L 3.8   CHLORIDE mmol/L 104   BUN mg/dL 7   CREATININE mg/dL 0.49*   CALCIUM mg/dL 9.4   EGFR ml/min/1.73sq m 123     Lipid Profile:

## 2023-07-23 NOTE — ASSESSMENT & PLAN NOTE
Multiple recurring episodes of NSVT noted on telemetry in the ED  Lopressor started to control   See additional comments  Monitor on telemetry   Will consult with EP to discuss further treatment options

## 2023-07-24 ENCOUNTER — APPOINTMENT (OUTPATIENT)
Dept: NON INVASIVE DIAGNOSTICS | Facility: HOSPITAL | Age: 38
End: 2023-07-24
Payer: COMMERCIAL

## 2023-07-24 VITALS
SYSTOLIC BLOOD PRESSURE: 125 MMHG | DIASTOLIC BLOOD PRESSURE: 76 MMHG | HEART RATE: 64 BPM | OXYGEN SATURATION: 98 % | WEIGHT: 198 LBS | BODY MASS INDEX: 29.33 KG/M2 | TEMPERATURE: 98.3 F | RESPIRATION RATE: 19 BRPM | HEIGHT: 69 IN

## 2023-07-24 PROBLEM — E05.90 HYPERTHYROIDISM: Status: ACTIVE | Noted: 2023-07-23

## 2023-07-24 PROBLEM — R00.2 PALPITATIONS: Status: ACTIVE | Noted: 2023-07-24

## 2023-07-24 PROBLEM — I49.3 VENTRICULAR ECTOPY: Status: RESOLVED | Noted: 2023-07-23 | Resolved: 2023-07-24

## 2023-07-24 PROBLEM — F41.9 ANXIETY: Status: ACTIVE | Noted: 2023-07-24

## 2023-07-24 LAB
AORTIC ROOT: 2.4 CM
AORTIC VALVE MEAN VELOCITY: 13.4 M/S
APICAL FOUR CHAMBER EJECTION FRACTION: 62 %
AV AREA BY CONTINUOUS VTI: 1.8 CM2
AV AREA PEAK VELOCITY: 1.8 CM2
AV LVOT MEAN GRADIENT: 3 MMHG
AV LVOT PEAK GRADIENT: 5 MMHG
AV MEAN GRADIENT: 8 MMHG
AV PEAK GRADIENT: 16 MMHG
AV VALVE AREA: 1.82 CM2
AV VELOCITY RATIO: 0.56
DOP CALC AO PEAK VEL: 1.98 M/S
DOP CALC AO VTI: 36.84 CM
DOP CALC LVOT AREA: 3.14 CM2
DOP CALC LVOT CARDIAC INDEX: 2.91 L/MIN/M2
DOP CALC LVOT CARDIAC OUTPUT: 6 L/MIN
DOP CALC LVOT DIAMETER: 2 CM
DOP CALC LVOT PEAK VEL VTI: 21.33 CM
DOP CALC LVOT PEAK VEL: 1.11 M/S
DOP CALC LVOT STROKE INDEX: 31.1 ML/M2
DOP CALC LVOT STROKE VOLUME: 66.98 CM3
FRACTIONAL SHORTENING: 35 % (ref 28–44)
INTERVENTRICULAR SEPTUM IN DIASTOLE (PARASTERNAL SHORT AXIS VIEW): 0.8 CM
INTERVENTRICULAR SEPTUM: 0.8 CM (ref 0.6–1.1)
LAAS-AP4: 15.2 CM2
LEFT ATRIUM SIZE: 3.6 CM
LEFT INTERNAL DIMENSION IN SYSTOLE: 3.6 CM (ref 2.1–4)
LEFT VENTRICULAR INTERNAL DIMENSION IN DIASTOLE: 5.5 CM (ref 3.5–6)
LEFT VENTRICULAR POSTERIOR WALL IN END DIASTOLE: 0.7 CM
LEFT VENTRICULAR STROKE VOLUME: 93 ML
LVSV (TEICH): 93 ML
MAX HR PERCENT: 85 %
MAX HR: 155 BPM
RATE PRESSURE PRODUCT: NORMAL
RIGHT ATRIUM AREA SYSTOLE A4C: 13.8 CM2
RIGHT VENTRICLE ID DIMENSION: 3.2 CM
SL CV PED ECHO LEFT VENTRICLE DIASTOLIC VOLUME (MOD BIPLANE) 2D: 147 ML
SL CV PED ECHO LEFT VENTRICLE SYSTOLIC VOLUME (MOD BIPLANE) 2D: 54 ML
SL CV STRESS RECOVERY BP: NORMAL MMHG
SL CV STRESS RECOVERY HR: 88 BPM
SL CV STRESS RECOVERY O2 SAT: 99 %
SL CV STRESS STAGE REACHED: 3
STRESS ANGINA INDEX: 0
STRESS BASELINE BP: NORMAL MMHG
STRESS BASELINE HR: 92 BPM
STRESS O2 SAT REST: 99 %
STRESS PEAK HR: 153 BPM
STRESS POST ESTIMATED WORKLOAD: 7.5 METS
STRESS POST EXERCISE DUR MIN: 6 MIN
STRESS POST EXERCISE DUR SEC: 21 SEC
STRESS POST O2 SAT PEAK: 99 %
STRESS POST PEAK BP: 160 MMHG
T4 FREE SERPL-MCNC: 2.38 NG/DL (ref 0.61–1.12)
TR MAX PG: 33 MMHG
TR PEAK VELOCITY: 2.9 M/S
TRICUSPID ANNULAR PLANE SYSTOLIC EXCURSION: 2.9 CM
TRICUSPID VALVE PEAK REGURGITATION VELOCITY: 2.89 M/S

## 2023-07-24 PROCEDURE — 99232 SBSQ HOSP IP/OBS MODERATE 35: CPT | Performed by: HOSPITALIST

## 2023-07-24 PROCEDURE — 93350 STRESS TTE ONLY: CPT

## 2023-07-24 PROCEDURE — 93350 STRESS TTE ONLY: CPT | Performed by: INTERNAL MEDICINE

## 2023-07-24 PROCEDURE — 99254 IP/OBS CNSLTJ NEW/EST MOD 60: CPT | Performed by: STUDENT IN AN ORGANIZED HEALTH CARE EDUCATION/TRAINING PROGRAM

## 2023-07-24 PROCEDURE — 99232 SBSQ HOSP IP/OBS MODERATE 35: CPT | Performed by: INTERNAL MEDICINE

## 2023-07-24 PROCEDURE — 99239 HOSP IP/OBS DSCHRG MGMT >30: CPT | Performed by: HOSPITALIST

## 2023-07-24 RX ORDER — METOPROLOL TARTRATE 50 MG/1
50 TABLET, FILM COATED ORAL 2 TIMES DAILY
Status: DISCONTINUED | OUTPATIENT
Start: 2023-07-24 | End: 2023-07-24 | Stop reason: HOSPADM

## 2023-07-24 RX ORDER — METOPROLOL TARTRATE 50 MG/1
50 TABLET, FILM COATED ORAL 2 TIMES DAILY
Qty: 60 TABLET | Refills: 0 | Status: SHIPPED | OUTPATIENT
Start: 2023-07-24 | End: 2023-08-23

## 2023-07-24 RX ADMIN — METOROPROLOL TARTRATE 5 MG: 5 INJECTION, SOLUTION INTRAVENOUS at 05:36

## 2023-07-24 RX ADMIN — METOPROLOL TARTRATE 50 MG: 50 TABLET, FILM COATED ORAL at 10:31

## 2023-07-24 NOTE — DISCHARGE INSTR - AVS FIRST PAGE
Dear Alicia Gonzalez,     It was our pleasure to care for you here at [facility], [facility]. It is our hope that we were always able to exceed the expected standards for your care during your stay. You were hospitalized due to palpitations, and thyroiditis. You were cared for on the medical/surgical floor by Jennifer Sandoval MD with the [facility] who covers for your primary care physician (PCP), No primary care provider on file. , while you were hospitalized. You were additionally seen by Dr. Kiki Olson of the [facility]ology [facility], and by Dr. Mario Brito of endocrinology. If you have any questions or concerns related to this hospitalization, you may contact us at [phone]. For follow up as well as any medication refills, we recommend that you follow up with your primary care physician. A registered nurse will reach out to you by phone within a few days after your discharge to answer any additional questions that you may have after going home. However, at this time we provide for you here, the most important instructions / recommendations at discharge:     Notable Medication Adjustments -   New prescription metoprolol tartrate 50 mg-take 1 tablet twice a day  Testing Required after Discharge -   To be further determined in the outpatient setting by your primary care provider and/or cardiology/endocrinology  Important follow up information -   Please follow-up with the providers as outlined in this discharge package  Other Instructions -   Please maintain a healthy diet  Please review this entire after visit summary as additional general instructions including medication list, appointments, activity, diet, any pertinent wound care, and other additional recommendations from your care team that may be provided for you.       Sincerely,     Jennifer Sandoval MD

## 2023-07-24 NOTE — CASE MANAGEMENT
Case Management Discharge Planning Note    Patient name Alicia Gonzalez  Location /-01 MRN [id number]  : 1985 Date 2023       Current Admission Date: 2023  Current Admission Diagnosis:NSVT (nonsustained ventricular tachycardia) [facility])   Patient Active Problem List    Diagnosis Date Noted   • Ventricular ectopy 2023   • NSVT (nonsustained ventricular tachycardia) ([address]) 2023   • Low TSH level 2023      LOS (days): 1  Geometric Mean LOS (GMLOS) (days):   Days to GMLOS:     OBJECTIVE:  Risk of Unplanned Readmission Score: 6.17         Current admission status: Inpatient   Preferred Pharmacy:   [address] [place] [address] [id number] Cec[address] [address]  Phone: [phone] Fax: [phone]    Primary Care Provider: No primary care provider on file. Primary Insurance: [address] [place] [address]  Secondary Insurance:     DISCHARGE DETAILS:  Additional Comments: Chart reviewed for discharge planning. Pt IPTA. Pt has no anticipated CM discharge needs. Pt viral return home with family when stable. CM to follow.

## 2023-07-24 NOTE — UTILIZATION REVIEW
Initial Clinical Review    Admission: Date/Time/Statement:   Admission Orders (From admission, onward)     Ordered        07/23/23 1230  INPATIENT ADMISSION  Once                      Orders Placed This Encounter   Procedures   • INPATIENT ADMISSION     Standing Status:   Standing     Number of Occurrences:   1     Order Specific Question:   Level of Care     Answer:   Med Surg [16]     Order Specific Question:   Estimated length of stay     Answer:   More than 2 Midnights     Order Specific Question:   Certification     Answer:   I certify that inpatient services are medically necessary for this patient for a duration of greater than two midnights. See H&P and MD Progress Notes for additional information about the patient's course of treatment. ED Arrival Information     Expected   -    Arrival   7/23/2023 07:44    Acuity   Urgent            Means of arrival   Walk-In    Escorted by   Family Member    Service   Hospitalist    Admission type   Emergency            Arrival complaint   sore throat           Chief Complaint   Patient presents with   • Medical Problem     Patient reporting anxiety and panic attacks over night. Initial Presentation: 43 y.o. female to the ED from home with complaints of panic attack. REcently diagnosed with tracheitis. Admitted to inpatient for NSVT. ARrives anxious, tachycardic, agitated. EKG shows NSVT, PVCs. Cardiology consult. Given IV lopressor in the ED. Cardiology consult:  NSVT, ventricular ectopy on tele. Started on lopressor po. Check ECHO, monitor tele. Date: 7/24   Day 2: NSVT resolved. TSH level low. Consult endocrine. Cardiology consult: NSVT appears to represent right ventricular outflow tract origin. Continue bb. Plan for stress test, ECHo. PROCEDURE NOTE  STRESS ECHO  7/24   Stress ECG: Arrhythmias during recovery: occasional PVCs. No runs.  The stress ECG is negative for ischemia after maximal exercise, without reproduction of symptoms. •  Left Ventricle: Left ventricular cavity size is normal. Systolic function is normal. Wall motion is normal.  •  Peak Stress Echo: Left ventricle cavity has normal reduction in size at peak stress. The left ventricle systolic function is normal at peak stress. The peak stress echo showed normal wall motion. •  Echo Post Impression: The study is normal.     No evidence for right ventricular enlargement. No evidence for cardiomyopathy. PVCs at rest and with stress but no significant runs. No evidence for ischemia nor evidence for exercise worsened dysrhythmia.       ENdocrinology:  Hyperthyroidism. H/o recent URI with neck and throat discomfort.      ED Triage Vitals [07/23/23 0751]   Temperature Pulse Respirations Blood Pressure SpO2   (!) 97.3 °F (36.3 °C) (!) 120 16 167/77 98 %      Temp Source Heart Rate Source Patient Position - Orthostatic VS BP Location FiO2 (%)   Tympanic Monitor Sitting Left arm --      Pain Score       No Pain          Wt Readings from Last 1 Encounters:   07/23/23 90.1 kg (198 lb 10.9 oz)     Additional Vital Signs:   Date/Time Temp Pulse Resp BP MAP (mmHg) SpO2 O2 Device Patient Position - Orthostatic VS   07/24/23 07:18:37 98.2 °F (36.8 °C) 82 18 157/87 110 97 % -- --   07/24/23 05:37:13 -- 84 -- 130/90 103 98 % -- --   07/24/23 02:59:18 98.8 °F (37.1 °C) 89 18 180/85 Abnormal  117 98 % None (Room air) Lying   07/23/23 2300 98.7 °F (37.1 °C) 75 18 169/99 122 100 % None (Room air) Sitting   07/23/23 22:09:14 -- 91 -- 166/94 118 97 % -- --   07/23/23 20:02:18 -- 97 -- 156/100 119 97 % -- --   07/23/23 1900 98.4 °F (36.9 °C) 98 20 167/106 Abnormal  126 96 % None (Room air) Lying   07/23/23 1339 -- -- -- -- -- -- None (Room air) --   07/23/23 13:17:30 98.3 °F (36.8 °C) 87 20 145/98 114 97 % -- --   07/23/23 1230 -- 83 22 152/69 99 97 % -- --   07/23/23 1215 -- 94 23 Abnormal  152/65 93 96 % -- --   07/23/23 1200 -- 85 22 164/73 105 97 % -- --   07/23/23 1145 -- 80 22 166/72 103 97 % -- --   07/23/23 1130 -- 83 23 Abnormal  153/73 100 97 % -- --   07/23/23 1100 -- 85 29 Abnormal  177/88 Abnormal  126 97 %         Pertinent Labs/Diagnostic Test Results:   7/23 EKG: Sinus rhythm with frequent Premature ventricular complexes  Otherwise normal ECG  No previous ECGs available  XR chest 1 view portable   Final Result by Jayleen Swann MD (07/23 1203)      No acute cardiopulmonary disease.                Workstation performed: [id number]               Results from last 7 days   Lab Units 07/23/23  0845   WBC Thousand/uL 7.72   HEMOGLOBIN g/dL 15.0   HEMATOCRIT % 43.8   PLATELETS Thousands/uL 268   NEUTROS ABS Thousands/µL 5.30         Results from last 7 days   Lab Units 07/23/23  0845   SODIUM mmol/L 136   POTASSIUM mmol/L 3.8   CHLORIDE mmol/L 104   CO2 mmol/L 23   ANION GAP mmol/L 9   BUN mg/dL 7   CREATININE mg/dL 0.49*   EGFR ml/min/1.73sq m 123   CALCIUM mg/dL 9.4   MAGNESIUM mg/dL 2.0             Results from last 7 days   Lab Units 07/23/23  0845   GLUCOSE RANDOM mg/dL 120         Results from last 7 days   Lab Units 07/23/23  1255 07/23/23  1117 07/23/23  0845   HS TNI 0HR ng/L  --   --  4   HS TNI 2HR ng/L  --  4  --    HSTNI D2 ng/L  --  0  --    HS TNI 4HR ng/L 5  --   --    HSTNI D4 ng/L 1  --   --      Results from last 7 days   Lab Units 07/23/23  0845   D-DIMER QUANTITATIVE ug/ml FEU <0.27         Results from last 7 days   Lab Units 07/23/23  0845   TSH 3RD GENERATON uIU/mL <0.010*       ED Treatment:   Medication Administration from 07/23/2023 0744 to 07/23/2023 1306       Date/Time Order Dose Route Action Comments     07/23/2023 1034 EDT sodium chloride 0.9 % bolus 1,000 mL 1,000 mL Intravenous New Bag --     07/23/2023 1117 EDT metoprolol (LOPRESSOR) injection 5 mg 5 mg Intravenous Given --     07/23/2023 1056 EDT metoprolol (LOPRESSOR) injection 5 mg 5 mg Intravenous Given --     07/23/2023 1041 EDT metoprolol (LOPRESSOR) injection 5 mg 5 mg Intravenous Given -- Admitting Diagnosis: Palpitation [R00.2]  Panic attack [F41.0]  Hypertension [I10]  Ventricular ectopy [I49.3]  Age/Sex: 43 y.o. female  Admission Orders:  Scheduled Medications:  metoprolol tartrate, 50 mg, Oral, BID      Continuous IV Infusions:     PRN Meds:  acetaminophen, 650 mg, Oral, Q6H PRN  ondansetron, 4 mg, Intravenous, Q6H PRN        IP CONSULT TO CARDIOLOGY  IP CONSULT TO ENDOCRINOLOGY    Network Utilization Review Department  ATTENTION: Please call with any questions or concerns to [phone] and carefully listen to the prompts so that you are directed to the right person. All voicemails are confidential.  Ananya Man all requests for admission clinical reviews, approved or denied determinations and any other requests to dedicated fax number below belonging to the campus where the patient is receiving treatment.  List of dedicated fax numbers for the Facilities:  [place] DENIALS (Administrative/Medical Necessity) [phone] [address] (Maternity/NICU/Pediatrics) [phone]   [address] [place] [address] [phone]   [facility] [address] [facility] [phone]   [address] [facility] [address] [place] [address] [facility] [phone]   [address] [address] [phone]

## 2023-07-24 NOTE — ASSESSMENT & PLAN NOTE
· Initial TSH was less than 0.010  · Free T4 is elevated at 2.38  · Status post an endocrinology evaluation-diagnosis is most likely a thyroiditis  · Cleared by endocrine for discharge  · They will follow-up in the outpatient setting

## 2023-07-24 NOTE — DISCHARGE SUMMARY
[id number] [facility]  Discharge- Florinda Mccain 1985, 43 y.o. female MRN: [id number]  Unit/Bed#: -01 Encounter: [id number]  Primary Care Provider: No primary care provider on file. Date and time admitted to hospital: 7/23/2023  7:48 AM    * NSVT (nonsustained ventricular tachycardia) (HCC)  Assessment & Plan  · Currently resolved-initially noted in the emergency room  · Appreciate cardiology input  · Currently on metoprolol 5 mg IV every 6 hours -cardiology to further optimize medications  · Patient is scheduled for an inpatient 2D stress echo today  · Discharge planning after the patient has been cleared by cardiology    Update/addendum at 4 PM  · Status post a cardiology evaluation  · Status post a 2D stress echo- results as follows: No evidence for right ventricular enlargement. No evidence for cardiomyopathy. PVCs at rest and with stress but no significant runs. No evidence for ischemia nor evidence for exercise worsened dysrhythmia  · Case reviewed with Dr. Matthew Puentes for discharge on metoprolol tartrate 50 mg p.o. twice daily  · Cardiology will be following up in the near future in the outpatient setting    Hyperthyroidism  Assessment & Plan  · Initial TSH was less than 0.010  · Free T4 is elevated at 2.38  · Status post an endocrinology evaluation-diagnosis is most likely a thyroiditis  · Cleared by endocrine for discharge  · They will follow-up in the outpatient setting    [address] [place] [address]  · Outpatient follow-up with PCP        Discharging Physician / Practitioner: Remington Benz MD  PCP: No primary care provider on file.   Admission Date:   Admission Orders (From admission, onward)     Ordered        07/23/23 1230  INPATIENT ADMISSION  Once                      Discharge Date: 07/24/23    Medical Problems     Resolved Problems  Date Reviewed: 7/24/2023          Resolved    Ventricular ectopy 7/24/2023     Resolved by  Guanaco Landis MD          Consultations During Hospital Stay:  · Cardiology  · Endocrinology    Procedures Performed:   · None    Significant Findings / Test Results:   · X-ray chest-no acute cardiopulmonary disease  · 2D stress echo-see the results as outlined above    Incidental Findings:   · None    Test Results Pending at Discharge (will require follow up): · None     Outpatient Tests Requested:  · None    Complications:    • None    Reason for Admission: Nonsustained ventricular tachycardia    Hospital Course: Alejo Warner is a 43 y.o. female patient who originally presented to the hospital on 7/23/2023 due to anxiety. Please refer to the initial history and physical examination completed by Dr. Octavio Fierro for the initial presenting features and complaints. In brief, the patient is a 23-year-old female, who presented to the ER yesterday on 7/23/2023 with a chief complaint of anxiety. She was noted on telemetry monitoring in the ER to have runs of nonsustained ventricular tachycardia. Patient was admitted to [address] [place] [address]. She was seen in conjunction with cardiology. A 2D stress echo was performed with results as outlined above. Patient was started on metoprolol tartrate 50 mg p.o. twice daily. As part of the work-up for nonsustained ventricular tachycardia, a TSH was checked. This TSH was abnormally low at less than 0.010, and the patient was found to have an elevated free T4. Patient was seen in conjunction with endocrine, they felt that these findings were consistent with the possibility of a thyroiditis. There was no concern for thyroid storm. She was cleared by endocrine for discharge, they will be following up in the near future in the outpatient setting for further management. Patient will additionally follow-up with cardiology.   Please refer to the assessment/plan portion of this discharge summary as outlined above for additional details regarding the patient's stay here in the hospital.    Please see above list of diagnoses and related plan for additional information. Condition at Discharge: good     Discharge Day Visit / Exam:     Please refer to my progress note from earlier today for the final day visit/exam details    Discussion with Family: Patient's parents were brought up to par    Discharge instructions/Information to patient and family:   See after visit summary for information provided to patient and family. Provisions for Follow-Up Care:  See after visit summary for information related to follow-up care and any pertinent home health orders. Disposition:     Home      Planned Readmission:   • None     Discharge Statement:  I spent 45 minutes discharging the patient. This time was spent on the day of discharge. I had direct contact with the patient on the day of discharge. Greater than 50% of the total time was spent examining patient, answering all patient questions, arranging and discussing plan of care with patient as well as directly providing post-discharge instructions. Additional time then spent on discharge activities. Discharge Medications:  See after visit summary for reconciled discharge medications provided to patient and family.       ** Please Note: This note has been constructed using a voice recognition system **

## 2023-07-24 NOTE — UTILIZATION REVIEW
NOTIFICATION OF INPATIENT ADMISSION   AUTHORIZATION REQUEST   SERVICING FACILITY:   [address] [place] [address]  [id number] [place] [address]  Tax ID: [id number]  NPI: [id number]   ATTENDING PROVIDER:  Attending Name and NPI#: Hernandez Noel [[id number]]  Address: [address] [place] [address]  Phone: [phone]     ADMISSION INFORMATION:  [facility]   Place of Service Code: 21  Inpatient Admission Date/Time: 7/23/23 12:30 PM  Discharge Date/Time: No discharge date for patient encounter. Admitting Diagnosis Code/Description:  Palpitation [R00.2]  Panic attack [F41.0]  Hypertension [I10]  Ventricular ectopy [I49.3]     UTILIZATION REVIEW CONTACT:  Caleb Chapin Utilization   Network Utilization Review Department  Phone: [phone]  Fax [phone]  Email: [email or web address]  Contact for approvals/pending authorizations, clinical reviews, and discharge. PHYSICIAN ADVISORY SERVICES:  Medical Necessity Denial & Fbeg-nw-Mxbh Review  Phone: [phone]  Fax: [phone]  Email: [email or web address]

## 2023-07-24 NOTE — ASSESSMENT & PLAN NOTE
Patient presented with panic attack, severely anxious, difficulty sleeping and excessive sweating, was found to have nonsustained VT, currently on Metoprolol 50 mg bid which resolved her symptoms,   She is clinically stable,  Her TFT showed TSH of < 0.010 and free T4 of 2.38. She has recent history URI with neck and throat discomfort,  Differentials are subacute thyroiditis, Grave's disease and Toxic adenoma. Likely diagnosis is subacute thyroiditis however other causes should be ruled out. I do recommend to check Total T3, TSH ,free T4, TSI , TRAb, ESR and CRP,   I defer starting antithyroid medications for now, will continue metoprolol,  Outpatient endocrinology follow up.   Discussed with primary team.

## 2023-07-24 NOTE — PROGRESS NOTES
[id number] [facility]  Progress Note  Name: Darwin Aguilar  MRN: [id number]  Unit/Bed#: -01 I Date of Admission: 2023   Date of Service: 2023 I Hospital Day: 1    Assessment/Plan   * NSVT (nonsustained ventricular tachycardia) ([address])  Assessment & Plan  · Currently resolved-initially noted in the emergency room  · Appreciate cardiology input  · Currently on metoprolol 5 mg IV every 6 hours -cardiology to further optimize medications  · Patient is scheduled for an inpatient 2D stress echo today  · Discharge planning after the patient has been cleared by cardiology    Low TSH level  Assessment & Plan  · Initial TSH was less than 0.010  · Free T4 is elevated at 2.38  · We will consult endocrinology           VTE Prophylaxis:  Patient is extremely low risk, young, and fully ambulatory-SCDs only    Patient Centered Rounds: I have performed bedside rounds with nursing staff today.     Discussions with Specialists or Other Care Team Provider: Cardiology  Education and Discussions with Family / Patient: The patient, and her parents who are bedside at the time of my rounding evaluation were all brought up to par, all questions answered to their collective satisfaction    Current Length of Stay: 1 day(s)    Current Patient Status: Inpatient   Certification Statement: The patient will continue to require additional inpatient hospital stay due to The need for stress test, and an endocrine evaluation    Discharge Plan: Hopeful discharge planning once cleared by both endocrine and cardiology    Code Status: Level 1 - Full Code    Subjective:   Patient seen, sitting up on the couch, reports feeling okay today, no new complaints    Objective:     Vitals:   Temp (24hrs), Av.5 °F (36.9 °C), Min:98.2 °F (36.8 °C), Max:98.8 °F (37.1 °C)    Temp:  [98.2 °F (36.8 °C)-98.8 °F (37.1 °C)] 98.2 °F (36.8 °C)  HR:  [75-98] 82  Resp:  [18-29] 18  BP: (130-180)/() 157/87  SpO2:  [96 %-100 %] 97 %  Body mass index is 29.34 kg/m². Input and Output Summary (last 24 hours): Intake/Output Summary (Last 24 hours) at 7/24/2023 3213  Last data filed at 7/24/2023 9479  Gross per 24 hour   Intake 240 ml   Output --   Net 240 ml       Physical Exam:   Physical Exam  Constitutional:       General: She is not in acute distress. Appearance: Normal appearance. She is normal weight. She is not ill-appearing. HENT:      Head: Normocephalic and atraumatic. Nose: Nose normal.      Mouth/Throat:      Mouth: Mucous membranes are moist.   Eyes:      Extraocular Movements: Extraocular movements intact. Pupils: Pupils are equal, round, and reactive to light. Cardiovascular:      Rate and Rhythm: Normal rate and regular rhythm. Pulses: Normal pulses. Heart sounds: Normal heart sounds. No murmur heard. No friction rub. No gallop. Pulmonary:      Effort: Pulmonary effort is normal. No respiratory distress. Breath sounds: Normal breath sounds. No wheezing, rhonchi or rales. Abdominal:      General: There is no distension. Palpations: Abdomen is soft. There is no mass. Tenderness: There is no abdominal tenderness. Hernia: No hernia is present. Musculoskeletal:         General: No swelling or tenderness. Normal range of motion. Cervical back: Normal range of motion and neck supple. No rigidity. Right lower leg: No edema. Left lower leg: No edema. Skin:     General: Skin is warm. Capillary Refill: Capillary refill takes less than 2 seconds. Findings: No erythema or rash. Neurological:      General: No focal deficit present. Mental Status: She is alert and oriented to person, place, and time. Mental status is at baseline. Cranial Nerves: No cranial nerve deficit. Motor: No weakness.    Psychiatric:         Mood and Affect: Mood normal.         Behavior: Behavior normal.         Additional Data:     Labs:    Results from last 7 days Lab Units 07/23/23  0845   WBC Thousand/uL 7.72   HEMOGLOBIN g/dL 15.0   HEMATOCRIT % 43.8   PLATELETS Thousands/uL 268   NEUTROS PCT % 69   LYMPHS PCT % 24   MONOS PCT % 7   EOS PCT % 0     Results from last 7 days   Lab Units 07/23/23  0845   SODIUM mmol/L 136   POTASSIUM mmol/L 3.8   CHLORIDE mmol/L 104   CO2 mmol/L 23   BUN mg/dL 7   CREATININE mg/dL 0.49*   CALCIUM mg/dL 9.4                   * I Have Reviewed All Lab Data Listed Above. * Additional Pertinent Lab Tests Reviewed: [address] Admission  Reviewed    Imaging:  Imaging Reports Reviewed Today Include: None    Recent Cultures (last 7 days):           Last 24 Hours Medication List:   Current Facility-Administered Medications   Medication Dose Route Frequency Provider Last Rate   • acetaminophen  650 mg Oral Q6H PRN Abraham Moncada MD     • metoprolol  5 mg Intravenous Taylor Phelps MD     • ondansetron  4 mg Intravenous Q6H PRN Abraham Moncada MD          Today, Patient Was Seen By: Monserrat Daniel MD    ** Please Note: Dictation voice to text software may have been used in the creation of this document.  **

## 2023-07-24 NOTE — ASSESSMENT & PLAN NOTE
Appears to represent right ventricular outflow tract origin. Now on beta-blockers. Stress test and echo to further define other issues/risk will be done later this morning. I also note hyperthyroidism which may be an issue here.

## 2023-07-24 NOTE — PROGRESS NOTES
[id number] [facility]  Progress Note  Name: Srinivasan Bland  MRN: [id number]  Unit/Bed#: -Alejandrina I Date of Admission: 7/23/2023   Date of Service: 7/24/2023 I Hospital Day: 1    Assessment/Plan   * NSVT (nonsustained ventricular tachycardia) ([address])  Assessment & Plan  Appears to represent right ventricular outflow tract origin. Now on beta-blockers. Stress test and echo to further define other issues/risk will be done later this morning. I also note hyperthyroidism which may be an issue here. Hyperthyroidism  Assessment & Plan  New symptomatology. Reviewed with hospitalist group. Outpatient Cardiologist: Will be [place]      Subjective:   Patient seen and examined. No significant events overnight. Summary comments:  Ectopy better on beta blocker. Further comments after stress test and echo. Telemetry/ECG/Cardiac testing:   Pending. Vitals: Blood pressure 157/87, pulse 82, temperature 98.2 °F (36.8 °C), resp. rate 18, height 5' 9" (1.753 m), weight 90.1 kg (198 lb 10.9 oz), SpO2 97 %.,   Orthostatic Blood Pressures    Flowsheet Row Most Recent Value   Blood Pressure 157/87 filed at 07/24/2023 0718   Patient Position - Orthostatic VS Lying filed at 07/24/2023 0259      ,   Weight (last 2 days)     Date/Time Weight    07/23/23 1300 90.1 (198.68)          Physical Exam:    General:  Normal appearance in no distress. Eyes:  Anicteric. Oral mucosa:  Moist.  Neck:  No JV D. Carotid upstrokes are brisk without bruits. No masses. Chest:  Clear to auscultation and percussion. Cardiac:  No palpable PMI. Normal S1 and S2. No murmur gallop or rub. Abdomen:  Soft and nontender. No palpable organomegaly or aortic enlargement. Extremities:  No peripheral edema. Neuro:  Grossly symmetric. Psych:  Alert and oriented x3.       Medications:      Current Facility-Administered Medications:   •  acetaminophen (TYLENOL) tablet 650 mg, 650 mg, Oral, Q6H PRN, [employer] Darryn Burgess MD  •  metoprolol tartrate (LOPRESSOR) tablet 50 mg, 50 mg, Oral, BID, Dejuan Butt MD  •  [facility]ansetron [facility] injection 4 mg, 4 mg, Intravenous, Q6H PRN, Ajith Roberts MD     Labs & Results:    Troponins:    Results from last 7 days   Lab Units 07/23/23  1255 07/23/23  1117 07/23/23  0845   HS TNI 0HR ng/L  --   --  4   HS TNI 2HR ng/L  --  4  --    HSTNI D2 ng/L  --  0  --    HS TNI 4HR ng/L 5  --   --    HSTNI D4 ng/L 1  --   --         BNP:     CBC with diff:   Results from last 7 days   Lab Units 07/23/23  0845   WBC Thousand/uL 7.72   HEMOGLOBIN g/dL 15.0   HEMATOCRIT % 43.8   MCV fL 75*   PLATELETS Thousands/uL 268     TSH:     CMP:   Results from last 7 days   Lab Units 07/23/23  0845   POTASSIUM mmol/L 3.8   CHLORIDE mmol/L 104   CO2 mmol/L 23   BUN mg/dL 7   CREATININE mg/dL 0.49*   EGFR ml/min/1.73sq m 123     Lipid Profile:     Coags:

## 2023-07-24 NOTE — NURSING NOTE
Pt DC to home via family in stable condition. All belongings packed and sent with pt. AVS reviewed and sent.

## 2023-07-24 NOTE — PLAN OF CARE
Problem: PAIN - ADULT  Goal: Verbalizes/displays adequate comfort level or baseline comfort level  Description: Interventions:  - Encourage patient to monitor pain and request assistance  - Assess pain using appropriate pain scale  - Administer analgesics based on type and severity of pain and evaluate response  - Implement non-pharmacological measures as appropriate and evaluate response  - Consider cultural and social influences on pain and pain management  - Notify physician/advanced practitioner if interventions unsuccessful or patient reports new pain  Outcome: Not Progressing     Problem: SAFETY ADULT  Goal: Patient will remain free of falls  Description: INTERVENTIONS:  - Educate patient/family on patient safety including physical limitations  - Instruct patient to call for assistance with activity   - Consult OT/PT to assist with strengthening/mobility   - Keep Call bell within reach  - Keep bed low and locked with side rails adjusted as appropriate  - Keep care items and personal belongings within reach  - Initiate and maintain comfort rounds  - Make Fall Risk Sign visible to staff  - Offer Toileting every 2 Hours, in advance of need  - Initiate/Maintain bed alarm  - Obtain necessary fall risk management equipment: non skid socks  - Apply yellow socks and bracelet for high fall risk patients  - Consider moving patient to room near nurses station  Outcome: Not Progressing

## 2023-07-24 NOTE — ASSESSMENT & PLAN NOTE
· Currently resolved-initially noted in the emergency room  · Appreciate cardiology input  · Currently on metoprolol 5 mg IV every 6 hours -cardiology to further optimize medications  · Patient is scheduled for an inpatient 2D stress echo today  · Discharge planning after the patient has been cleared by cardiology [Negative] : Skin

## 2023-07-24 NOTE — ASSESSMENT & PLAN NOTE
· Currently resolved-initially noted in the emergency room  · Appreciate cardiology input  · Currently on metoprolol 5 mg IV every 6 hours -cardiology to further optimize medications  · Patient is scheduled for an inpatient 2D stress echo today  · Discharge planning after the patient has been cleared by cardiology    Update/addendum at 4 PM  · Status post a cardiology evaluation  · Status post a 2D stress echo- results as follows: No evidence for right ventricular enlargement. No evidence for cardiomyopathy. PVCs at rest and with stress but no significant runs.  No evidence for ischemia nor evidence for exercise worsened dysrhythmia  · Case reviewed with Dr. Ryan Virk for discharge on metoprolol tartrate 50 mg p.o. twice daily  · Cardiology will be following up in the near future in the outpatient setting

## 2023-07-24 NOTE — CONSULTS
Consultation - Rexann Goltz 43 y.o. female MRN: [id number]    Unit/Bed#: -01 Encounter: [id number]      Assessment/Plan     Hyperthyroidism  Assessment & Plan  Patient presented with panic attack, severely anxious, difficulty sleeping and excessive sweating, was found to have nonsustained VT, currently on Metoprolol 50 mg bid which resolved her symptoms,   She is clinically stable,  Her TFT showed TSH of < 0.010 and free T4 of 2.38. She has recent history URI with neck and throat discomfort,  Differentials are subacute thyroiditis, Grave's disease and Toxic adenoma. Likely diagnosis is subacute thyroiditis however other causes should be ruled out. I do recommend to check Total T3, TSH ,free T4, TSI , TRAb, ESR and CRP,   I defer starting antithyroid medications for now, will continue metoprolol,  Outpatient endocrinology follow up. Discussed with primary team.        Inpatient consult to Endocrinology  Consult performed by: Shakeel Land MD  Consult ordered by: Nithin Flores MD          CC: hyperthyroidism    History of Present Illness     HPI: Rexann Goltz is a 43y.o. year old female who presented for feeling anxious, nervous, was found to have nonsustained ventricular tachycardia,  Cardiology is consulted for abnormal thyroid function test results. Patient was seen and examined at bedside, she reports that few days ago due to discomfort in her neck and throat along with cough visited urgent care, was diagnosed with tracheitis, then started feeling severely anxious with palpitations, and panic attack. TFT results showed suppressed TSH wit elevated Free T4 of 2.38. She is started on Metoprolol 50 mg bid and her symptoms has resolved. Review of Systems   Constitutional: Positive for fatigue. Negative for appetite change and unexpected weight change. HENT: Negative for trouble swallowing and voice change. Eyes: Negative for visual disturbance.    Respiratory: Negative for cough, shortness Patient called and would like to know if she is able to still have peanut butter even though there is salt in the peanut butter.     Please advise patient.    of breath and wheezing. Cardiovascular: Positive for palpitations. Negative for leg swelling. Gastrointestinal: Negative for abdominal pain, constipation, diarrhea, nausea and vomiting. Endocrine: Positive for heat intolerance. Negative for cold intolerance, polyphagia and polyuria. Musculoskeletal: Negative for arthralgias. Skin: Negative for color change, rash and wound. Neurological: Negative for dizziness, tremors, weakness, light-headedness, numbness and headaches. Psychiatric/Behavioral: Positive for sleep disturbance. Negative for agitation. The patient is nervous/anxious. Historical Information   History reviewed. No pertinent past medical history. Past Surgical History:   Procedure Laterality Date   •  SECTION       Social History   Social History     Substance and Sexual Activity   Alcohol Use Never     Social History     Substance and Sexual Activity   Drug Use Never     Social History     Tobacco Use   Smoking Status Never   • Passive exposure: Never   Smokeless Tobacco Never     Family History: History reviewed. No pertinent family history. Meds/Allergies   Current Facility-Administered Medications   Medication Dose Route Frequency Provider Last Rate Last Admin   • acetaminophen (TYLENOL) tablet 650 mg  650 mg Oral Q6H PRN Yulissa Velazquez MD       • metoprolol tartrate (LOPRESSOR) tablet 50 mg  50 mg Oral BID Eladia Diaz MD   50 mg at 23 1031   • ondansetron (ZOFRAN) injection 4 mg  4 mg Intravenous Q6H PRN Yulissa Velazquez MD         No Known Allergies    Objective   Vitals: Blood pressure 146/84, pulse 92, temperature 98.2 °F (36.8 °C), resp. rate 20, height 5' 9" (1.753 m), weight 89.8 kg (198 lb), SpO2 99 %.     Intake/Output Summary (Last 24 hours) at 2023 1451  Last data filed at 2023 0718  Gross per 24 hour   Intake 240 ml   Output --   Net 240 ml     Invasive Devices     Peripheral Intravenous Line  Duration Peripheral IV 07/23/23 Right Antecubital 1 day                Physical Exam  Constitutional:       Appearance: She is well-developed. HENT:      Head: Normocephalic and atraumatic. Nose: Nose normal.   Eyes:      Pupils: Pupils are equal, round, and reactive to light. Neck:      Thyroid: No thyromegaly. Vascular: No JVD. Comments: Normal size, not tender, no nodule noted,    Cardiovascular:      Rate and Rhythm: Normal rate and regular rhythm. Heart sounds: Normal heart sounds. No murmur heard. No friction rub. No gallop. Pulmonary:      Effort: Pulmonary effort is normal. No respiratory distress. Breath sounds: Normal breath sounds. No stridor. No wheezing or rales. Chest:      Chest wall: No tenderness. Abdominal:      General: Bowel sounds are normal. There is no distension. Palpations: Abdomen is soft. There is no mass. Tenderness: There is no abdominal tenderness. There is no guarding. Musculoskeletal:         General: No deformity. Normal range of motion. Cervical back: Normal range of motion. Skin:     General: Skin is warm. Neurological:      Mental Status: She is alert and oriented to person, place, and time. The history was obtained from the review of the chart, patient. Lab Results:       Lab Results   Component Value Date    WBC 7.72 07/23/2023    HGB 15.0 07/23/2023    HCT 43.8 07/23/2023    MCV 75 (L) 07/23/2023     07/23/2023     Lab Results   Component Value Date/Time    BUN 7 07/23/2023 08:45 AM    K 3.8 07/23/2023 08:45 AM     07/23/2023 08:45 AM    CO2 23 07/23/2023 08:45 AM    CREATININE 0.49 (L) 07/23/2023 08:45 AM     Component      Latest Ref Rng 7/23/2023   TSH 3RD GENERATON      0.450 - 4.500 uIU/mL <0.010 (L)    Free T4      0.61 - 1.12 ng/dL 2.38 (H)       Legend:  (L) Low  (H) High      Imaging Studies: I have personally reviewed pertinent reports.       Portions of the record may have been created with voice recognition software.

## 2023-07-25 DIAGNOSIS — E05.90 HYPERTHYROIDISM: Primary | ICD-10-CM

## 2023-07-25 LAB
ARRHY DURING EX: NORMAL
CHEST PAIN STATEMENT: NORMAL
MAX DIASTOLIC BP: 90 MMHG
MAX HEART RATE: 155 BPM
MAX PREDICTED HEART RATE: 181 BPM
MAX. SYSTOLIC BP: 160 MMHG
PROTOCOL NAME: NORMAL
REASON FOR TERMINATION: NORMAL
TARGET HR FORMULA: NORMAL
TEST INDICATION: NORMAL
TIME IN EXERCISE PHASE: NORMAL

## 2023-07-25 NOTE — UTILIZATION REVIEW
NOTIFICATION OF ADMISSION DISCHARGE   This is a Notification of Discharge from [facility]. Please be advised that this patient has been discharge from our facility. Below you will find the admission and discharge date and time including the patient’s disposition. UTILIZATION REVIEW CONTACT:  Nyla Martin  Utilization   Network Utilization Review Department  Phone: [phone] carefully listen to the prompts. All voicemails are confidential.  Email: [email or web address]     ADMISSION INFORMATION  PRESENTATION DATE: 7/23/2023  7:48 AM  OBERVATION ADMISSION DATE:   INPATIENT ADMISSION DATE: 7/23/23 12:30 PM   DISCHARGE DATE: 7/24/2023  4:25 PM   DISPOSITION:Home/Self Care    IMPORTANT INFORMATION:  Send all requests for admission clinical reviews, approved or denied determinations and any other requests to dedicated fax number below belonging to the campus where the patient is receiving treatment.  List of dedicated fax numbers:  [place] DENIALS (Administrative/Medical Necessity) [phone] [address] [facility] (Maternity/NICU/Pediatrics) [phone]   [facility] [phone]   [facility] [phone] [address] [facility] [phone]   [address] [place] [address] [phone]   [facility] [phone]   [address] [place] [address] [address] [place] [address] [phone]   [address] [place] [address] [phone]   [address] [place] [address] [phone]   [address] [facility] [address] [facility] [phone]

## 2023-07-25 NOTE — PROGRESS NOTES
Lab orders placed, patient will complete tomorrow morning and should be resulted prior to appt on the 28th

## 2023-07-26 ENCOUNTER — APPOINTMENT (OUTPATIENT)
Dept: LAB | Facility: CLINIC | Age: 38
End: 2023-07-26

## 2023-07-26 DIAGNOSIS — E05.90 HYPERTHYROIDISM: ICD-10-CM

## 2023-07-26 LAB
T3 SERPL-MCNC: 3.2 NG/ML
T4 FREE SERPL-MCNC: 2.73 NG/DL (ref 0.61–1.12)
TSH SERPL DL<=0.05 MIU/L-ACNC: <0.01 UIU/ML (ref 0.45–4.5)

## 2023-07-26 PROCEDURE — 84445 ASSAY OF TSI GLOBULIN: CPT

## 2023-07-26 PROCEDURE — 84432 ASSAY OF THYROGLOBULIN: CPT

## 2023-07-26 PROCEDURE — 84439 ASSAY OF FREE THYROXINE: CPT

## 2023-07-26 PROCEDURE — 84480 ASSAY TRIIODOTHYRONINE (T3): CPT

## 2023-07-26 PROCEDURE — 86800 THYROGLOBULIN ANTIBODY: CPT

## 2023-07-26 PROCEDURE — 84443 ASSAY THYROID STIM HORMONE: CPT

## 2023-07-27 LAB
THYROGLOB AB SERPL-ACNC: <1 IU/ML (ref 0–0.9)
THYROGLOB SERPL-MCNC: 67.8 NG/ML (ref 1.5–38.5)

## 2023-07-28 ENCOUNTER — OFFICE VISIT (OUTPATIENT)
Dept: ENDOCRINOLOGY | Facility: CLINIC | Age: 38
End: 2023-07-28
Payer: COMMERCIAL

## 2023-07-28 VITALS
OXYGEN SATURATION: 99 % | SYSTOLIC BLOOD PRESSURE: 104 MMHG | HEART RATE: 44 BPM | WEIGHT: 198 LBS | HEIGHT: 69 IN | BODY MASS INDEX: 29.33 KG/M2 | RESPIRATION RATE: 18 BRPM | DIASTOLIC BLOOD PRESSURE: 62 MMHG

## 2023-07-28 DIAGNOSIS — E05.90 HYPERTHYROIDISM: Primary | ICD-10-CM

## 2023-07-28 PROCEDURE — 99214 OFFICE O/P EST MOD 30 MIN: CPT | Performed by: STUDENT IN AN ORGANIZED HEALTH CARE EDUCATION/TRAINING PROGRAM

## 2023-07-28 NOTE — PROGRESS NOTES
Chief complaint, hyperthyroidism. Referring Provider  Ryanne Mcfadden Do  1951 [address], [address]     History of Present Illness:   Nereida Doss is a 43 y.o. female with hyperthyrodism who presented for follow-up after her recent admission for palpitation, none sustained VT,  Patient is accompanied by her parents for support. Patient presented with panic attack, severely anxious, difficulty sleeping and excessive sweating, was found to have nonsustained VT. TFT showed TSH of < 0.010 and free T4 of 2.38. She has recent history URI with neck and throat discomfort,  Metoprolol 50 mg twice a day started and patient improved clinically significantly. Repeated labs showed following results,    Component      Latest Ref Rng 2023   Free T4      0.61 - 1.12 ng/dL 2.73 (H)    THYROGLOBULIN AB      0.0 - 0.9 IU/mL <1.0    TSH 3RD GENERATON      0.450 - 4.500 uIU/mL <0.010 (L)    T3, Total      0.9-1.8 ng/mL ng/mL 3.2 (H)    Sed Rate      0 - 19 mm/hour 21 (H)    C-REACTIVE PROTEIN      <3.0 mg/L 3.7 (H)    Thyroglobulin-ДМИТРИЙ      1.5 - 38.5 ng/mL 67.8 (H)           Patient Active Problem List   Diagnosis   • NSVT (nonsustained ventricular tachycardia) (HCC)   • Hyperthyroidism   • Anxiety   • Palpitations      History reviewed. No pertinent past medical history. Past Surgical History:   Procedure Laterality Date   •  SECTION        History reviewed. No pertinent family history. Social History     Tobacco Use   • Smoking status: Never     Passive exposure: Never   • Smokeless tobacco: Never   Substance Use Topics   • Alcohol use: Never     No Known Allergies      Current Outpatient Medications:   •  metoprolol tartrate (LOPRESSOR) 50 mg tablet, Take 1 tablet (50 mg total) by mouth 2 (two) times a day, Disp: 60 tablet, Rfl: 0  Review of Systems   Constitutional: Negative for appetite change, fatigue and unexpected weight change.    HENT: Negative for trouble swallowing and voice change. Eyes: Negative for visual disturbance. Respiratory: Negative for cough, shortness of breath and wheezing. Cardiovascular: Negative for palpitations and leg swelling. Gastrointestinal: Negative for abdominal pain, constipation, diarrhea, nausea and vomiting. Endocrine: Negative for cold intolerance, heat intolerance, polyphagia and polyuria. Musculoskeletal: Negative for arthralgias. Skin: Negative for color change, rash and wound. Neurological: Negative for dizziness, tremors, weakness, light-headedness, numbness and headaches. Psychiatric/Behavioral: Negative for agitation and sleep disturbance. The patient is nervous/anxious. Physical Exam:  Body mass index is 29.24 kg/m². /62   Pulse (!) 44   Resp 18   Ht 5' 9" (1.753 m)   Wt 89.8 kg (198 lb)   SpO2 99%   BMI 29.24 kg/m²    Wt Readings from Last 3 Encounters:   07/28/23 89.8 kg (198 lb)   07/24/23 89.8 kg (198 lb)       GEN: NAD  E/n/m nl facies, hearing intact bilat, tongue midline, lips nl  Eyes: no stare or proptosis, nl lids and conjunctiva, EOMI  Neck: trachea midline, thyroid NT to palpation, nl in size, no nodules or neck masses noted, no cervical LAD  CV; heart reg rate s1s2 nl, no m/r/g appreciated, no TANO  Resp: CTAB, good effort  Ab+BS  Neuro: no tremor, 2+ DTRs in BUE  MS: no c/c in digits, moves all 4 ext, nl muscle bulk, gait nl  Skin: warm and dry, no palmar erythema  Ext: no c/c in digits, no edema bilaterally, 2+ DP and PT pulses bilat,  Psych: nl mood and affect, no gross lapses in memory    DATA:  Labs:       Lab Results   Component Value Date    FREET4 2.73 (H) 07/26/2023           Impression:  1. Hyperthyroidism           Plan:    Diagnoses and all orders for this visit:    Hyperthyroidism:     Reviewed pathophysiology of hyperthyroidism, reviewing the hypothalamic-ptiuitary-thyroid axis and interpretation and significance of TSH, FT4, FT3 values.   Discussed possible etiologies of hyperthyroidism  Discussed possible treatments along with potential side effects,   As patient's symptoms improved on beta-blocker, sudden onset of symptoms in context of prior URI, subacute thyroiditis is likely however other diagnoses including Graves' eye disease and toxic adenoma should be ruled out. ESR, CRP elevated, in favor of thyroiditis,  TSI still in process. Will wait for the result determining the etiology and further management. For now will continue with metoprolol, dose was reduced to 25 mg twice a day as patient's heart rate was between 50-60,   TSH, free T4 and total T3 in 1 month. Return back in 3 months    -     TSH, 3rd generation Lab Collect; Future  -     T4, free Lab Collect; Future  -     T3 Lab Collect; Future        Discussed with the patient and all questioned fully answered. She will call us if any problems arise. Rosina Wood MD    I have spent a total time of 30 minutes on 07/28/23 in caring for this patient including Diagnostic results, Prognosis, Instructions for management, Patient and family education, Risk factor reductions, Impressions, Counseling / Coordination of care, Documenting in the medical record, Reviewing / ordering tests, medicine, procedures   and Obtaining or reviewing history  .

## 2023-07-29 LAB — TSI SER-ACNC: <0.1 IU/L (ref 0–0.55)

## 2023-08-21 ENCOUNTER — APPOINTMENT (OUTPATIENT)
Dept: LAB | Facility: CLINIC | Age: 38
End: 2023-08-21
Payer: COMMERCIAL

## 2023-08-21 DIAGNOSIS — E05.90 HYPERTHYROIDISM: ICD-10-CM

## 2023-08-21 LAB
T3 SERPL-MCNC: 2.8 NG/ML
T4 FREE SERPL-MCNC: 1.81 NG/DL (ref 0.61–1.12)
TSH SERPL DL<=0.05 MIU/L-ACNC: <0.01 UIU/ML (ref 0.45–4.5)

## 2023-08-21 PROCEDURE — 84439 ASSAY OF FREE THYROXINE: CPT

## 2023-08-21 PROCEDURE — 84480 ASSAY TRIIODOTHYRONINE (T3): CPT

## 2023-08-21 PROCEDURE — 84443 ASSAY THYROID STIM HORMONE: CPT

## 2023-08-21 PROCEDURE — 36415 COLL VENOUS BLD VENIPUNCTURE: CPT

## 2023-08-23 DIAGNOSIS — E05.90 HYPERTHYROIDISM: Primary | ICD-10-CM

## 2023-08-25 ENCOUNTER — HOSPITAL ENCOUNTER (OUTPATIENT)
Dept: ULTRASOUND IMAGING | Facility: HOSPITAL | Age: 38
End: 2023-08-25
Attending: STUDENT IN AN ORGANIZED HEALTH CARE EDUCATION/TRAINING PROGRAM
Payer: COMMERCIAL

## 2023-08-25 DIAGNOSIS — E05.90 HYPERTHYROIDISM: ICD-10-CM

## 2023-08-25 PROCEDURE — 76536 US EXAM OF HEAD AND NECK: CPT

## 2023-08-26 ENCOUNTER — HOSPITAL ENCOUNTER (EMERGENCY)
Facility: HOSPITAL | Age: 38
Discharge: HOME | End: 2023-08-26
Attending: FAMILY MEDICINE
Payer: COMMERCIAL

## 2023-08-26 VITALS
OXYGEN SATURATION: 99 % | RESPIRATION RATE: 20 BRPM | TEMPERATURE: 97.2 F | SYSTOLIC BLOOD PRESSURE: 197 MMHG | HEART RATE: 92 BPM | DIASTOLIC BLOOD PRESSURE: 93 MMHG

## 2023-08-26 DIAGNOSIS — I83.92 VARICOSE VEINS OF LEFT LOWER EXTREMITY: Primary | ICD-10-CM

## 2023-08-26 PROCEDURE — 99282 EMERGENCY DEPT VISIT SF MDM: CPT

## 2023-08-26 PROCEDURE — 99284 EMERGENCY DEPT VISIT MOD MDM: CPT | Performed by: FAMILY MEDICINE

## 2023-08-28 DIAGNOSIS — E04.1 THYROID NODULE: ICD-10-CM

## 2023-08-28 DIAGNOSIS — E05.90 HYPERTHYROIDISM: Primary | ICD-10-CM

## 2023-08-29 ENCOUNTER — TELEPHONE (OUTPATIENT)
Dept: VASCULAR SURGERY | Facility: CLINIC | Age: 38
End: 2023-08-29

## 2023-08-29 NOTE — TELEPHONE ENCOUNTER
Patient calling regarding her ultrasound results. She is a little nervous and would like if Dr. Umesh Frausto would be able to reach out to her to discuss further.  Please call patient at [phone]

## 2023-09-05 ENCOUNTER — HOSPITAL ENCOUNTER (OUTPATIENT)
Dept: NUCLEAR MEDICINE | Facility: HOSPITAL | Age: 38
Discharge: HOME | End: 2023-09-05
Attending: STUDENT IN AN ORGANIZED HEALTH CARE EDUCATION/TRAINING PROGRAM
Payer: COMMERCIAL

## 2023-09-05 DIAGNOSIS — E04.1 THYROID NODULE: ICD-10-CM

## 2023-09-05 DIAGNOSIS — E05.90 HYPERTHYROIDISM: ICD-10-CM

## 2023-09-05 PROCEDURE — G1004 CDSM NDSC: HCPCS

## 2023-09-05 PROCEDURE — 78014 THYROID IMAGING W/BLOOD FLOW: CPT

## 2023-09-05 PROCEDURE — A9516 IODINE I-123 SOD IODIDE MIC: HCPCS

## 2023-09-06 ENCOUNTER — HOSPITAL ENCOUNTER (OUTPATIENT)
Dept: NUCLEAR MEDICINE | Facility: HOSPITAL | Age: 38
Discharge: HOME | End: 2023-09-06
Attending: STUDENT IN AN ORGANIZED HEALTH CARE EDUCATION/TRAINING PROGRAM
Payer: COMMERCIAL

## 2023-09-06 ENCOUNTER — OFFICE VISIT (OUTPATIENT)
Dept: CARDIOLOGY CLINIC | Facility: CLINIC | Age: 38
End: 2023-09-06
Payer: COMMERCIAL

## 2023-09-06 VITALS
DIASTOLIC BLOOD PRESSURE: 88 MMHG | BODY MASS INDEX: 28.88 KG/M2 | HEIGHT: 69 IN | OXYGEN SATURATION: 98 % | WEIGHT: 195 LBS | RESPIRATION RATE: 22 BRPM | SYSTOLIC BLOOD PRESSURE: 162 MMHG | HEART RATE: 100 BPM

## 2023-09-06 DIAGNOSIS — I47.29 NSVT (NONSUSTAINED VENTRICULAR TACHYCARDIA) (HCC): ICD-10-CM

## 2023-09-06 DIAGNOSIS — R00.2 PALPITATION: ICD-10-CM

## 2023-09-06 DIAGNOSIS — R00.0 TACHYCARDIA: ICD-10-CM

## 2023-09-06 DIAGNOSIS — I10 BENIGN ESSENTIAL HTN: Primary | ICD-10-CM

## 2023-09-06 PROCEDURE — 99214 OFFICE O/P EST MOD 30 MIN: CPT | Performed by: INTERNAL MEDICINE

## 2023-09-06 RX ORDER — METOPROLOL TARTRATE 50 MG/1
50 TABLET, FILM COATED ORAL 2 TIMES DAILY
Qty: 180 TABLET | Refills: 5 | Status: SHIPPED | OUTPATIENT
Start: 2023-09-06 | End: 2023-10-06

## 2023-09-06 NOTE — PROGRESS NOTES
Patient ID: Chiquis Marin is a 43 y.o. female. Plan:      NSVT (nonsustained ventricular tachycardia) (HCC)  RVOT. Better on beta blocker. Normal stress test and echo. Benign essential HTN  Borderline controlled but will increase the beta blocker dose back to 50 mg twice daily. Tachycardia  See above. Follow up Plan/Other summary comments:  6-month follow-up. HPI: Patient is seen in follow-up today regarding the above. Since her hospital stay she generally has done okay. By her description thyroiditis seems to be resolving although TSH still is very low. She is under the care of endocrinology. Heart rate was reported to be low so beta-blocker dose was diminished but today blood pressure and pulse are both high. Thus I am going to increase the dose back to 50 twice daily and she will self monitor at home with a pulse oximeter. No palpitations syncope or near syncope. Most recent or relevant cardiac/vascular testing:    Stress echo 2023: Normal LV and RV systolic function. PVCs at rest and with stress but no runs. No ischemia or worsening dysrhythmia. Past Surgical History:   Procedure Laterality Date   •  SECTION         Lipid Profile: No results found for: "CHOL", "TRIG", "HDL", "LDL"      Review of Systems   10  point ROS  was otherwise non pertinent or negative except as per HPI or as below. Gait:  Normal.        Objective:     /88 (BP Location: Left arm, Patient Position: Sitting, Cuff Size: Large)   Pulse 100   Resp 22   Ht 5' 9" (1.753 m)   Wt 88.5 kg (195 lb)   SpO2 98%   BMI 28.80 kg/m²     PHYSICAL EXAM:    General:  Normal appearance in no distress. Eyes:  Anicteric. Oral mucosa:  Moist.  Neck:  No JVD. Carotid upstrokes are brisk without bruits. No masses. Chest:  Clear to auscultation. Cardiac:  No palpable PMI. Normal S1 and S2. No murmur gallop or rub. Abdomen:  Soft and nontender.  No palpable organomegaly or aortic enlargement. Extremities:  No peripheral edema. Musculoskeletal:  Symmetric. Vascular:  Femoral pulses are brisk without bruits. Popliteal pulses are intact bilaterally. Pedal pulses are intact. Neuro:  Grossly symmetric. Psych:  Alert and oriented x3.         Current Outpatient Medications:   •  metoprolol tartrate (LOPRESSOR) 50 mg tablet, Take 1 tablet (50 mg total) by mouth 2 (two) times a day, Disp: 180 tablet, Rfl: 5  No Known Allergies  Past Medical History:   Diagnosis Date   • NSVT (nonsustained ventricular tachycardia) ([facility])            Social History     Tobacco Use   Smoking Status Never   • Passive exposure: Never   Smokeless Tobacco Never

## 2023-09-06 NOTE — PATIENT INSTRUCTIONS
Increase the metoprolol back to 50 mg twice daily. Buy a pulse oximeter or something similar to check your pulse rate a few times a week. I am making the assumption that your heart rate will stay higher than 55 bpm despite the higher dose but you can get back to me if that is not the case.

## 2023-09-08 ENCOUNTER — TELEPHONE (OUTPATIENT)
Dept: ENDOCRINOLOGY | Facility: CLINIC | Age: 38
End: 2023-09-08

## 2023-09-08 DIAGNOSIS — E05.10 THYROID ADENOMA, TOXIC: Primary | ICD-10-CM

## 2023-09-08 RX ORDER — METHIMAZOLE 5 MG/1
5 TABLET ORAL DAILY
Qty: 1 TABLET | Refills: 0 | Status: SHIPPED | OUTPATIENT
Start: 2023-09-08 | End: 2023-09-11 | Stop reason: SDUPTHER

## 2023-09-11 DIAGNOSIS — E05.10 THYROID ADENOMA, TOXIC: ICD-10-CM

## 2023-09-11 RX ORDER — METHIMAZOLE 5 MG/1
5 TABLET ORAL DAILY
Qty: 90 TABLET | Refills: 0 | Status: SHIPPED | OUTPATIENT
Start: 2023-09-11 | End: 2023-12-10

## 2023-10-17 ENCOUNTER — APPOINTMENT (OUTPATIENT)
Dept: LAB | Facility: CLINIC | Age: 38
End: 2023-10-17
Payer: COMMERCIAL

## 2023-10-17 DIAGNOSIS — E05.90 HYPERTHYROIDISM: ICD-10-CM

## 2023-10-17 LAB
T4 FREE SERPL-MCNC: 1.2 NG/DL (ref 0.61–1.12)
TSH SERPL DL<=0.05 MIU/L-ACNC: <0.01 UIU/ML (ref 0.45–4.5)

## 2023-10-17 PROCEDURE — 84443 ASSAY THYROID STIM HORMONE: CPT

## 2023-10-17 PROCEDURE — 84439 ASSAY OF FREE THYROXINE: CPT

## 2023-10-17 PROCEDURE — 36415 COLL VENOUS BLD VENIPUNCTURE: CPT

## 2023-10-19 DIAGNOSIS — E05.90 HYPERTHYROIDISM: Primary | ICD-10-CM

## 2023-10-20 DIAGNOSIS — E05.10 THYROID ADENOMA, TOXIC: ICD-10-CM

## 2023-10-20 RX ORDER — METHIMAZOLE 5 MG/1
7.5 TABLET ORAL DAILY
Qty: 135 TABLET | Refills: 0 | Status: SHIPPED | OUTPATIENT
Start: 2023-10-20 | End: 2024-01-18

## 2023-11-02 ENCOUNTER — OFFICE VISIT (OUTPATIENT)
Dept: ENDOCRINOLOGY | Facility: CLINIC | Age: 38
End: 2023-11-02
Payer: COMMERCIAL

## 2023-11-02 VITALS
WEIGHT: 199 LBS | SYSTOLIC BLOOD PRESSURE: 148 MMHG | BODY MASS INDEX: 29.47 KG/M2 | TEMPERATURE: 97.3 F | OXYGEN SATURATION: 99 % | HEART RATE: 75 BPM | DIASTOLIC BLOOD PRESSURE: 82 MMHG | HEIGHT: 69 IN

## 2023-11-02 DIAGNOSIS — E04.2 MULTIPLE THYROID NODULES: ICD-10-CM

## 2023-11-02 DIAGNOSIS — E05.10 THYROID ADENOMA, TOXIC: Primary | ICD-10-CM

## 2023-11-02 DIAGNOSIS — E05.90 HYPERTHYROIDISM: ICD-10-CM

## 2023-11-02 PROCEDURE — 99214 OFFICE O/P EST MOD 30 MIN: CPT | Performed by: STUDENT IN AN ORGANIZED HEALTH CARE EDUCATION/TRAINING PROGRAM

## 2023-11-02 NOTE — PROGRESS NOTES
Brett Alarcon 43 y.o. female MRN: [id number]    Encounter: [id number]      Assessment/Plan     1. Multiple thyroid nodules    Thyroid ultrasound which was on to evaluate for TSI are negative hyperthyroidism showed multiple thyroid nodules:  1.8 x 1.2 x 0.7 cm upper posterior, solid hypoechoic with small cystic components and linear bright reflectors producing ringdown artifact  2.9 x 1.7 x 2 cm, interpolar, solid, hypoechoic, with central thick calcifications  0.8 x 0.7 x 0.8 cm, lower pole, solid hypoechoic with central large calcifications. Left thyroid nodules:  2.3 x 1.9 x 1.5 cm, upper pole, solid and cystic  1.5 x 1.6 x 1 cm, interpolar, solid with large peripheral calcifications  3.3 x 2.8 x 2.4 cm, lower pole, mixed predominantly solid with large calcifications. Isthmic nodule:  0.9 x 0.9 x 0.6 cm, hypoechoic cystic and solid    Given suppressed TSH and thyroid uptake scan was done and showed increased uptake of the radiopharmaceutical in the left lower lobe and right middle lobe/interpolar region with decreased uptake in the remaining thyroid gland. Most noticeably there is decreased uptake of tracer in the   right lower lobe, and to a lesser extent, the left upper lobe. These findings are likely indicative of underlying hot/hyperfunctioning adenoma locations. This coincides with the location of the two largest nodules seen on thyroid ultrasound. As 2 nodules which was recommended for biopsy demonstrated hot nodule, I defer biopsy for now. We will follow-up with ultrasound for further growth. 2. Thyroid adenoma, toxic  She is currently on methimazole 7.5 mg once daily, she is clinically and biochemically improving. Continue current regimen. 3. Hyperthyroidism  See plan for 1 and 2.       CC:     Hyperthyroidism      History of Present Illness     HPI:  Xander Paez is a 43 year with toxic adenoma who presents for follow up,  Was seen and evaluated in July 2023 for feeling severely anxious, difficulty sleeping, excessive sweating and panic attack, for which TFT was done and showed TSH of <0.01 and free T4 of 2.38.  TSI was checked was negative,  She is on Methimazole 7.5 mg daily, initially ESR was elevated at 21, with elevated CRP of 3.7,  Thyroid ultrasound was done and showed 1.8 x 1.2 x 0.7 cm upper posterior right thyroid nodule, 1.5 x 1.6 x 1 cm  1.5 x 1.6 x 1 cm, interpolar, solid with large peripheral calcifications and 3.3 x 2.8 x 2.4 cm, lower pole, mixed predominantly solid with large calcifications. Given suppressed TSH I ordered a thyroid iodine uptake scan which showed underlying hot/hyperfunctioning adenoma locations. This coincides with the location of the two largest nodules seen on thyroid ultrasound performed. She currently on Methimazole 7.5 mg daily which was increased from 5 mg daily with below TFT result:    Component      Latest Ref Rng 8/21/2023 10/17/2023   TSH 3RD GENERATON      0.450 - 4.500 uIU/mL <0.010 (L)  <0.010 (L)    Free T4      0.61 - 1.12 ng/dL 1.81 (H)  1.20 (H)    T3, Total      0.9-1.8 ng/mL ng/mL 2.8 (H)          She feels much better now, feeling more energy,       Review of Systems   Constitutional:  Negative for appetite change, fatigue and unexpected weight change. HENT:  Negative for trouble swallowing and voice change. Eyes:  Negative for visual disturbance. Respiratory:  Negative for cough, shortness of breath and wheezing. Cardiovascular:  Negative for palpitations and leg swelling. Gastrointestinal:  Negative for abdominal pain, constipation, diarrhea, nausea and vomiting. Endocrine: Negative for cold intolerance, heat intolerance, polyphagia and polyuria. Musculoskeletal:  Negative for arthralgias. Skin:  Negative for color change, rash and wound. Neurological:  Negative for dizziness, tremors, weakness, light-headedness, numbness and headaches. Psychiatric/Behavioral:  Negative for agitation and sleep disturbance.  The patient is not nervous/anxious. Historical Information   Past Medical History:   Diagnosis Date    NSVT (nonsustained ventricular tachycardia) ([facility])      Past Surgical History:   Procedure Laterality Date     SECTION       Social History   Social History     Substance and Sexual Activity   Alcohol Use Never     Social History     Substance and Sexual Activity   Drug Use Never     Social History     Tobacco Use   Smoking Status Never    Passive exposure: Never   Smokeless Tobacco Never     Family History: History reviewed. No pertinent family history. Meds/Allergies   Current Outpatient Medications   Medication Sig Dispense Refill    methimazole (TAPAZOLE) 5 mg tablet Take 1.5 tablets (7.5 mg total) by mouth in the morning 135 tablet 0    metoprolol tartrate (LOPRESSOR) 50 mg tablet Take 1 tablet (50 mg total) by mouth 2 (two) times a day 180 tablet 5     No current facility-administered medications for this visit. No Known Allergies    Objective   Vitals: Blood pressure 148/82, pulse 75, temperature (!) 97.3 °F (36.3 °C), temperature source Temporal, height 5' 9" (1.753 m), weight 90.3 kg (199 lb), SpO2 99 %. Physical Exam  Constitutional:       Appearance: She is well-developed. HENT:      Head: Normocephalic and atraumatic. Nose: Nose normal.   Eyes:      Pupils: Pupils are equal, round, and reactive to light. Neck:      Thyroid: No thyromegaly. Vascular: No JVD. Cardiovascular:      Rate and Rhythm: Normal rate and regular rhythm. Heart sounds: Normal heart sounds. No murmur heard. No friction rub. No gallop. Pulmonary:      Effort: Pulmonary effort is normal. No respiratory distress. Breath sounds: Normal breath sounds. No stridor. No wheezing or rales. Chest:      Chest wall: No tenderness. Abdominal:      General: Bowel sounds are normal. There is no distension. Palpations: Abdomen is soft. There is no mass. Tenderness:  There is no abdominal tenderness. There is no guarding. Musculoskeletal:         General: No deformity. Normal range of motion. Cervical back: Normal range of motion. Skin:     General: Skin is warm. Neurological:      Mental Status: She is alert and oriented to person, place, and time. The history was obtained from the review of the chart, patient. Lab Results:   Lab Results   Component Value Date/Time    TSH 3RD GENERATON <0.010 (L) 10/17/2023 08:19 AM    TSH 3RD GENERATON <0.010 (L) 08/21/2023 07:16 AM    TSH 3RD GENERATON <0.010 (L) 07/26/2023 07:17 AM    Free T4 1.20 (H) 10/17/2023 08:19 AM    Free T4 1.81 (H) 08/21/2023 07:16 AM    Free T4 2.73 (H) 07/26/2023 07:17 AM       Imaging Studies:   Results for orders placed during the hospital encounter of 08/25/23    US thyroid    Impression  Multinodular goiter, as above. T4 nodules, 2.9 cm in the right thyroid lobe and 3.3 cm in the left thyroid lobe. Recommend further evaluation with FNA. Reference: ACR Thyroid Imaging, Reporting and Data System (TI-RADS): White Paper of the [employer]. J AM Matt Radiol 2531;39:174-173. (additional recommendations based on American Thyroid Association 2015 guidelines.)      Workstation performed: [id number]      I have personally reviewed pertinent reports. THYROID SCAN AND UPTAKE     INDICATION: Multiple thyroid nodules with hyperthyroidism. Rule out toxic adenoma. []     COMPARISON: No nuclear studies available. Thyroid ultrasound 8/25/2023. TECHNIQUE:  The study was performed following the oral administration of 0.28 uCi I-123. FINDINGS:     The thyroid scan demonstrates increased uptake of the radiopharmaceutical in the left lower lobe and right middle lobe/interpolar region with decreased uptake in the remaining thyroid gland. Most noticeably there is decreased uptake of tracer in the   right lower lobe, and to a lesser extent, the left upper lobe.  These findings are likely indicative of underlying hot/hyperfunctioning adenoma locations. This coincides with the location of the two largest nodules seen on thyroid ultrasound performed   8/25/2023. Thyroid uptake values are:     6 hours:   28% (N =  5 -15%)     24 hours: 41% (N =15 - 35%)     IMPRESSION:     1. Hyperfunctioning nodules in the right mid and left lower thyroid lobes. 2. Increased thyroid uptake at both 6 and 24 hours. Resident: Bob Jasmine, the attending radiologist, have reviewed the images and agree with the final report above. Workstation performed: [id number]     Portions of the record may have been created with voice recognition software. Occasional wrong word or "sound a like" substitutions may have occurred due to the inherent limitations of voice recognition software. Read the chart carefully and recognize, using context, where substitutions have occurred.

## 2024-01-15 ENCOUNTER — APPOINTMENT (OUTPATIENT)
Dept: LAB | Facility: CLINIC | Age: 39
End: 2024-01-15
Payer: COMMERCIAL

## 2024-01-15 DIAGNOSIS — E05.90 HYPERTHYROIDISM: ICD-10-CM

## 2024-01-15 LAB
T3 SERPL-MCNC: 1.3 NG/ML
T4 FREE SERPL-MCNC: 0.73 NG/DL (ref 0.61–1.12)
TSH SERPL DL<=0.05 MIU/L-ACNC: <0.01 UIU/ML (ref 0.45–4.5)

## 2024-01-15 PROCEDURE — 84439 ASSAY OF FREE THYROXINE: CPT

## 2024-01-15 PROCEDURE — 84480 ASSAY TRIIODOTHYRONINE (T3): CPT

## 2024-01-15 PROCEDURE — 36415 COLL VENOUS BLD VENIPUNCTURE: CPT

## 2024-01-15 PROCEDURE — 84443 ASSAY THYROID STIM HORMONE: CPT

## 2024-01-26 DIAGNOSIS — E05.10 THYROID ADENOMA, TOXIC: ICD-10-CM

## 2024-01-26 RX ORDER — METHIMAZOLE 5 MG/1
TABLET ORAL
Qty: 135 TABLET | Refills: 0 | Status: SHIPPED | OUTPATIENT
Start: 2024-01-26

## 2024-03-04 ENCOUNTER — OFFICE VISIT (OUTPATIENT)
Dept: ENDOCRINOLOGY | Facility: CLINIC | Age: 39
End: 2024-03-04
Payer: COMMERCIAL

## 2024-03-04 VITALS
WEIGHT: 210 LBS | OXYGEN SATURATION: 100 % | TEMPERATURE: 98 F | HEIGHT: 69 IN | DIASTOLIC BLOOD PRESSURE: 80 MMHG | BODY MASS INDEX: 31.1 KG/M2 | HEART RATE: 83 BPM | SYSTOLIC BLOOD PRESSURE: 150 MMHG

## 2024-03-04 DIAGNOSIS — E05.20 TOXIC MULTINODULAR GOITER: ICD-10-CM

## 2024-03-04 DIAGNOSIS — E04.2 MULTIPLE THYROID NODULES: ICD-10-CM

## 2024-03-04 DIAGNOSIS — E05.90 HYPERTHYROIDISM: Primary | ICD-10-CM

## 2024-03-04 PROBLEM — E05.10 THYROID ADENOMA, TOXIC: Status: ACTIVE | Noted: 2024-03-04

## 2024-03-04 PROCEDURE — 99214 OFFICE O/P EST MOD 30 MIN: CPT | Performed by: STUDENT IN AN ORGANIZED HEALTH CARE EDUCATION/TRAINING PROGRAM

## 2024-03-04 NOTE — PROGRESS NOTES
Renata Alarcon 38 y.o. female MRN: [id number]    Encounter: [id number]      Assessment/Plan     Problem List Items Addressed This Visit          Endocrine    Hyperthyroidism - Primary     With negative TSI, likely due to toxic multinodular goiter, she has decided to continue with antithyroid medication, currently on methimazole which is 7.5 mg daily, her recent TFTs showed TSH suppressed less than 0.010 and total T3 of 1.3 and free T4 of 0.73.  I decrease methimazole to 5 mg daily.  We reviewed adverse reactions including skin rash, agranulocytosis as well as hepatitis, and she will call the office if she has developed skin rash, sore throat and fever, or jaundice.  We discussed other treatment options including total thyroidectomy, she is willing to continue with current management for now.         Relevant Orders    T4, free    TSH, 3rd generation    T3    Toxic multinodular goiter     See plan for hyperthyroidism         Multiple thyroid nodules     Right : 1.8 x 1.2 x 0.7 cm upper posterior, solid hypoechoic   2.9 x 1.7 x 2 cm, interpolar, solid, hypoechoic, with central thick calcifications  0.8 x 0.7 x 0.8 cm, lower pole, solid hypoechoic with central large calcifications     Left thyroid nodules:  2.3 x 1.9 x 1.5 cm, upper pole, solid and cystic  1.5 x 1.6 x 1 cm, interpolar, solid with large peripheral calcifications  3.3 x 2.8 x 2.4 cm, lower pole, mixed predominantly solid with large calcifications.     Isthmic nodule:  0.9 x 0.9 x 0.6 cm, hypoechoic cystic and solid.  Multiple thyroid nodules which was found for evaluation for hyperthyroidism with negative TSI, 3.3 cm left lower pole nodule and 2.9 cm interpolar nodule recommended for FNA biopsy which was deferred as these 2 nodules were reported hot nodules on thyroid ultrasound.  I reviewed follow-up with another ultrasound for evaluation for further growth.            CC:   Hyperthyroidism     History of Present Illness     HPI:  Renata Alarcon is a 38  year old female with history of hyperthyroidism who presented for follow up.  She is currently on methimazole 7.5 mg daily.  Recent TFT showed TSH < 0.010, T3 of 1.3, and free T4 of 0.73.      Review of Systems   Constitutional:  Negative for appetite change, fatigue and unexpected weight change.   HENT:  Negative for trouble swallowing and voice change.    Eyes:  Negative for visual disturbance.   Respiratory:  Negative for cough, shortness of breath and wheezing.    Cardiovascular:  Negative for palpitations and leg swelling.   Gastrointestinal:  Negative for abdominal pain, constipation, diarrhea, nausea and vomiting.   Endocrine: Negative for cold intolerance, heat intolerance, polyphagia and polyuria.   Musculoskeletal:  Negative for arthralgias.   Skin:  Negative for color change, rash and wound.   Neurological:  Negative for dizziness, tremors, weakness, light-headedness, numbness and headaches.   Psychiatric/Behavioral:  Negative for agitation and sleep disturbance. The patient is not nervous/anxious.        Historical Information   Past Medical History:   Diagnosis Date    NSVT (nonsustained ventricular tachycardia) (HCC)      Past Surgical History:   Procedure Laterality Date     SECTION       Social History   Social History     Substance and Sexual Activity   Alcohol Use Never     Social History     Substance and Sexual Activity   Drug Use Never     Social History     Tobacco Use   Smoking Status Never    Passive exposure: Never   Smokeless Tobacco Never     Family History: History reviewed. No pertinent family history.    Meds/Allergies   Current Outpatient Medications   Medication Sig Dispense Refill    methimazole (TAPAZOLE) 5 mg tablet TAKE 1.5 TABLETS BY MOUTH EVERY MORNING 135 tablet 0    metoprolol tartrate (LOPRESSOR) 50 mg tablet Take 1 tablet (50 mg total) by mouth 2 (two) times a day 180 tablet 5     No current facility-administered medications for this visit.     No Known  "Allergies    Objective   Vitals: Blood pressure 150/80, pulse 83, temperature 98 °F (36.7 °C), height 5' 9\" (1.753 m), weight 95.3 kg (210 lb), SpO2 100%.    Physical Exam  Constitutional:       Appearance: She is well-developed.   HENT:      Head: Normocephalic and atraumatic.      Nose: Nose normal.   Eyes:      Pupils: Pupils are equal, round, and reactive to light.   Neck:      Thyroid: No thyromegaly.      Vascular: No JVD.   Cardiovascular:      Rate and Rhythm: Normal rate and regular rhythm.      Heart sounds: Normal heart sounds. No murmur heard.     No friction rub. No gallop.   Pulmonary:      Effort: Pulmonary effort is normal. No respiratory distress.      Breath sounds: Normal breath sounds. No stridor. No wheezing or rales.   Chest:      Chest wall: No tenderness.   Abdominal:      General: Bowel sounds are normal. There is no distension.      Palpations: Abdomen is soft. There is no mass.      Tenderness: There is no abdominal tenderness. There is no guarding.   Musculoskeletal:         General: No deformity. Normal range of motion.      Cervical back: Normal range of motion.   Skin:     General: Skin is warm.   Neurological:      Mental Status: She is alert and oriented to person, place, and time.         The history was obtained from the review of the chart, patient.    Lab Results:   Lab Results   Component Value Date/Time    TSH 3RD GENERATON <0.010 (L) 01/15/2024 07:39 AM    TSH 3RD GENERATON <0.010 (L) 10/17/2023 08:19 AM    TSH 3RD GENERATON <0.010 (L) 08/21/2023 07:16 AM    Free T4 0.73 01/15/2024 07:39 AM    Free T4 1.20 (H) 10/17/2023 08:19 AM    Free T4 1.81 (H) 08/21/2023 07:16 AM     Component      Latest Ref Rng 1/15/2024   FREE T4      0.61 - 1.12 ng/dL 0.73    T3, Total      0.9-1.8 ng/mL ng/mL 1.3    TSH 3RD GENERATON      0.450 - 4.500 uIU/mL <0.010 (L)       Legend:  (L) Low  Imaging Studies:   Results for orders placed during the hospital encounter of 08/25/23    US " "thyroid    Impression  Multinodular goiter, as above. T4 nodules, 2.9 cm in the right thyroid lobe and 3.3 cm in the left thyroid lobe. Recommend further evaluation with FNA.        Reference: ACR Thyroid Imaging, Reporting and Data System (TI-RADS): White Paper of the ACR TI-RADS Committee. J AM Matt Radiol 2017;14:587-595. (additional recommendations based on American Thyroid Association 2015 guidelines.)      Workstation performed: [id number]  THYROID SCAN AND UPTAKE     INDICATION: Multiple thyroid nodules with hyperthyroidism. Rule out toxic adenoma. []     COMPARISON: No nuclear studies available. Thyroid ultrasound 8/25/2023.     TECHNIQUE:  The study was performed following the oral administration of 0.28 uCi I-123.     FINDINGS:     The thyroid scan demonstrates increased uptake of the radiopharmaceutical in the left lower lobe and right middle lobe/interpolar region with decreased uptake in the remaining thyroid gland. Most noticeably there is decreased uptake of tracer in the   right lower lobe, and to a lesser extent, the left upper lobe. These findings are likely indicative of underlying hot/hyperfunctioning adenoma locations. This coincides with the location of the two largest nodules seen on thyroid ultrasound performed   8/25/2023.     Thyroid uptake values are:     6 hours:   28% (N =  5 -15%)     24 hours: 41% (N =15 - 35%)     IMPRESSION:     1. Hyperfunctioning nodules in the right mid and left lower thyroid lobes.  2. Increased thyroid uptake at both 6 and 24 hours.          I have personally reviewed pertinent reports.      Portions of the record may have been created with voice recognition software. Occasional wrong word or \"sound a like\" substitutions may have occurred due to the inherent limitations of voice recognition software. Read the chart carefully and recognize, using context, where substitutions have occurred.    "

## 2024-03-04 NOTE — ASSESSMENT & PLAN NOTE
Right : 1.8 x 1.2 x 0.7 cm upper posterior, solid hypoechoic   2.9 x 1.7 x 2 cm, interpolar, solid, hypoechoic, with central thick calcifications  0.8 x 0.7 x 0.8 cm, lower pole, solid hypoechoic with central large calcifications     Left thyroid nodules:  2.3 x 1.9 x 1.5 cm, upper pole, solid and cystic  1.5 x 1.6 x 1 cm, interpolar, solid with large peripheral calcifications  3.3 x 2.8 x 2.4 cm, lower pole, mixed predominantly solid with large calcifications.     Isthmic nodule:  0.9 x 0.9 x 0.6 cm, hypoechoic cystic and solid.  Multiple thyroid nodules which was found for evaluation for hyperthyroidism with negative TSI, 3.3 cm left lower pole nodule and 2.9 cm interpolar nodule recommended for FNA biopsy which was deferred as these 2 nodules were reported hot nodules on thyroid ultrasound.  I reviewed follow-up with another ultrasound for evaluation for further growth.

## 2024-03-04 NOTE — ASSESSMENT & PLAN NOTE
With negative TSI, likely due to toxic multinodular goiter, she has decided to continue with antithyroid medication, currently on methimazole which is 7.5 mg daily, her recent TFTs showed TSH suppressed less than 0.010 and total T3 of 1.3 and free T4 of 0.73.  I decrease methimazole to 5 mg daily.  We reviewed adverse reactions including skin rash, agranulocytosis as well as hepatitis, and she will call the office if she has developed skin rash, sore throat and fever, or jaundice.  We discussed other treatment options including total thyroidectomy, she is willing to continue with current management for now.

## 2024-03-07 ENCOUNTER — OFFICE VISIT (OUTPATIENT)
Dept: CARDIOLOGY CLINIC | Facility: CLINIC | Age: 39
End: 2024-03-07
Payer: COMMERCIAL

## 2024-03-07 VITALS
SYSTOLIC BLOOD PRESSURE: 138 MMHG | HEIGHT: 69 IN | DIASTOLIC BLOOD PRESSURE: 80 MMHG | HEART RATE: 63 BPM | WEIGHT: 211 LBS | BODY MASS INDEX: 31.25 KG/M2

## 2024-03-07 DIAGNOSIS — I47.29 NSVT (NONSUSTAINED VENTRICULAR TACHYCARDIA) (HCC): ICD-10-CM

## 2024-03-07 DIAGNOSIS — I10 BENIGN ESSENTIAL HTN: ICD-10-CM

## 2024-03-07 DIAGNOSIS — E05.90 HYPERTHYROIDISM: Primary | ICD-10-CM

## 2024-03-07 PROCEDURE — 99214 OFFICE O/P EST MOD 30 MIN: CPT | Performed by: INTERNAL MEDICINE

## 2024-03-07 NOTE — PROGRESS NOTES
" Patient ID: Renata Alarcon is a 38 y.o. female.        Plan:      NSVT (nonsustained ventricular tachycardia) (HCC)  RVOT.  Better on beta blocker.  Normal stress test and echo.  Also may be better as thyroid has been treated.      Hyperthyroidism  Doing great on methimazole.    Benign essential HTN  Well controlled.     Follow up Plan/Other summary comments:  1 year return.    HPI:   Patient is seen today regarding the above.  Since the last visit she has felt well.  Thyroid is really getting back to normal.  Her methimazole dose was actually lowered.  She has had no recent palpitations whatsoever.    To reiterate patient had been found to have right ventricular outflow tract ventricular tachycardia coinciding with hyperthyroidism in .  Dysrhythmia responded well to beta-blocker and even better to concomitant treatment of thyroid disorder.        Most recent or relevant cardiac/vascular testing:     Stress echo 2023: Normal LV and RV systolic function.  PVCs at rest and with stress but no runs.  No ischemia or worsening dysrhythmia.      Past Surgical History:   Procedure Laterality Date     SECTION         Lipid Profile: Reviewed      Review of Systems   10  point ROS  was otherwise non pertinent or negative except as per HPI or as below.   Gait:  Normal.        Objective:     /80   Pulse 63   Ht 5' 9\" (1.753 m)   Wt 95.7 kg (211 lb)   BMI 31.16 kg/m²     PHYSICAL EXAM:    General:  Normal appearance in no distress.  Eyes:  Anicteric.  Oral mucosa:  Moist.  Neck:  No JVD. Carotid upstrokes are brisk without bruits.  No masses.  Chest:  Clear to auscultation.  Cardiac:  No palpable PMI.  Normal S1 and S2.  No murmur gallop or rub.  Abdomen:  Soft and nontender. No palpable organomegaly or aortic enlargement.  Extremities:  No peripheral edema.  Musculoskeletal:  Symmetric.   Vascular:  Femoral pulses are brisk without bruits.  Popliteal pulses are intact bilaterally.   Pedal pulses are " intact.  Neuro:  Grossly symmetric.  Psych:  Alert and oriented x3.      Meds reviewed.    Past Medical History:   Diagnosis Date    NSVT (nonsustained ventricular tachycardia) ([facility])            Social History     Tobacco Use   Smoking Status Never    Passive exposure: Never   Smokeless Tobacco Never

## 2024-03-07 NOTE — ASSESSMENT & PLAN NOTE
RVOT.  Better on beta blocker.  Normal stress test and echo.  Also may be better as thyroid has been treated.

## 2024-04-01 ENCOUNTER — APPOINTMENT (OUTPATIENT)
Dept: LAB | Facility: CLINIC | Age: 39
End: 2024-04-01
Payer: COMMERCIAL

## 2024-04-01 DIAGNOSIS — E05.90 HYPERTHYROIDISM: ICD-10-CM

## 2024-04-01 LAB
T3 SERPL-MCNC: 1.4 NG/ML
T4 FREE SERPL-MCNC: 0.56 NG/DL (ref 0.61–1.12)
TSH SERPL DL<=0.05 MIU/L-ACNC: 0.02 UIU/ML (ref 0.45–4.5)

## 2024-04-01 PROCEDURE — 84480 ASSAY TRIIODOTHYRONINE (T3): CPT

## 2024-04-01 PROCEDURE — 36415 COLL VENOUS BLD VENIPUNCTURE: CPT

## 2024-04-01 PROCEDURE — 84439 ASSAY OF FREE THYROXINE: CPT

## 2024-04-01 PROCEDURE — 84443 ASSAY THYROID STIM HORMONE: CPT

## 2024-05-25 DIAGNOSIS — E05.10 THYROID ADENOMA, TOXIC: ICD-10-CM

## 2024-05-28 RX ORDER — METHIMAZOLE 5 MG/1
TABLET ORAL
Qty: 135 TABLET | Refills: 0 | Status: SHIPPED | OUTPATIENT
Start: 2024-05-28

## 2024-07-10 ENCOUNTER — OFFICE VISIT (OUTPATIENT)
Dept: ENDOCRINOLOGY | Facility: CLINIC | Age: 39
End: 2024-07-10
Payer: COMMERCIAL

## 2024-07-10 VITALS
BODY MASS INDEX: 32.4 KG/M2 | TEMPERATURE: 98 F | WEIGHT: 219.4 LBS | SYSTOLIC BLOOD PRESSURE: 148 MMHG | HEART RATE: 60 BPM | DIASTOLIC BLOOD PRESSURE: 80 MMHG | OXYGEN SATURATION: 99 %

## 2024-07-10 DIAGNOSIS — E05.90 HYPERTHYROIDISM: ICD-10-CM

## 2024-07-10 DIAGNOSIS — E05.20 TOXIC MULTINODULAR GOITER: ICD-10-CM

## 2024-07-10 DIAGNOSIS — E04.2 MULTIPLE THYROID NODULES: Primary | ICD-10-CM

## 2024-07-10 PROCEDURE — 99214 OFFICE O/P EST MOD 30 MIN: CPT | Performed by: STUDENT IN AN ORGANIZED HEALTH CARE EDUCATION/TRAINING PROGRAM

## 2024-07-10 NOTE — ASSESSMENT & PLAN NOTE
Likely due to toxic multinodular goiter, currently on methimazole 5 mg daily 5 days a week, no recent TFT, she is clinically euthyroid.  I ordered free T4 and TSH,  will adjust methimazole accordingly.  Inform us ASAP if you have a sore throat, fever, abdominal pain, rash or vomiting as it may be due to methimazole

## 2024-07-10 NOTE — PROGRESS NOTES
Renata Alarcon 39 y.o. female MRN: [id number]    Encounter: [id number]      Assessment & Plan     Problem List Items Addressed This Visit          Endocrine    Hyperthyroidism     Likely due to toxic multinodular goiter, currently on methimazole 5 mg daily 5 days a week, no recent TFT, she is clinically euthyroid.  I ordered free T4 and TSH,  will adjust methimazole accordingly.  Inform us ASAP if you have a sore throat, fever, abdominal pain, rash or vomiting as it may be due to methimazole               Relevant Orders    T4, free    TSH, 3rd generation    Toxic multinodular goiter     Right : 1.8 x 1.2 x 0.7 cm upper posterior, solid hypoechoic   2.9 x 1.7 x 2 cm, interpolar, solid, hypoechoic, with central thick calcifications  0.8 x 0.7 x 0.8 cm, lower pole, solid hypoechoic with central large calcifications     Left thyroid nodules:  2.3 x 1.9 x 1.5 cm, upper pole, solid and cystic  1.5 x 1.6 x 1 cm, interpolar, solid with large peripheral calcifications  3.3 x 2.8 x 2.4 cm, lower pole, mixed predominantly solid with large calcifications.     Isthmic nodule:  0.9 x 0.9 x 0.6 cm, hypoechoic cystic and solid.  2 nodules the criteria for biopsy, left lower 3.0 cm nodule and 2.9 cm right-sided nodule, however these 2 nodules are compatible with hyperfunctioning nodule (hot) which has low risk of malignancy, we deferred biopsy given hyperfunctioning of nodules,  The concerning features was calcification, although this is not typical calcification for cancer which is punctuate (previously called microcalcification), I may proceed with FNA biopsy, I ordered TFT, and would determine next management option pending the result.           Multiple thyroid nodules - Primary     See plan for toxic multinodular goiter.            CC:   Hyperthyroidism    History of Present Illness     HPI:  Renata Alarcon is a 39-year-old female who presents for a routine follow-up for hyperthyroidism.  Hyperthyroidism likely due to toxic  multinodular goiter.  Currently on methimazole 5 mg 5 days a week.  She feels well.    Review of Systems   Constitutional:  Negative for appetite change, fatigue and unexpected weight change.   HENT:  Negative for trouble swallowing and voice change.    Cardiovascular:  Negative for chest pain and palpitations.   Gastrointestinal:  Negative for abdominal pain, constipation, diarrhea, nausea and vomiting.   Endocrine: Negative for cold intolerance, heat intolerance, polydipsia, polyphagia and polyuria.       Historical Information   Past Medical History:   Diagnosis Date    NSVT (nonsustained ventricular tachycardia) (HCC)      Past Surgical History:   Procedure Laterality Date     SECTION       Social History   Social History     Substance and Sexual Activity   Alcohol Use Never     Social History     Substance and Sexual Activity   Drug Use Never     Social History     Tobacco Use   Smoking Status Never    Passive exposure: Never   Smokeless Tobacco Never     Family History: History reviewed. No pertinent family history.    Meds/Allergies   Current Outpatient Medications   Medication Sig Dispense Refill    methimazole (TAPAZOLE) 5 mg tablet TAKE 1.5 TABLETS BY MOUTH EVERY MORNING 135 tablet 0    metoprolol tartrate (LOPRESSOR) 50 mg tablet Take 1 tablet (50 mg total) by mouth 2 (two) times a day 180 tablet 5     No current facility-administered medications for this visit.     No Known Allergies    Objective   Vitals: Blood pressure 148/80, pulse 60, temperature 98 °F (36.7 °C), weight 99.5 kg (219 lb 6.4 oz), SpO2 99%.    Physical Exam  Constitutional:       Appearance: She is well-developed.   HENT:      Head: Normocephalic and atraumatic.      Nose: Nose normal.   Eyes:      Extraocular Movements: EOM normal.      Pupils: Pupils are equal, round, and reactive to light.   Neck:      Thyroid: No thyromegaly.      Vascular: No JVD.   Cardiovascular:      Rate and Rhythm: Normal rate and regular rhythm.       "Heart sounds: Normal heart sounds. No murmur heard.     No friction rub. No gallop.   Pulmonary:      Effort: Pulmonary effort is normal. No respiratory distress.      Breath sounds: Normal breath sounds. No stridor. No wheezing or rales.   Chest:      Chest wall: No tenderness.   Abdominal:      General: Bowel sounds are normal. There is no distension.      Palpations: Abdomen is soft. There is no mass.      Tenderness: There is no abdominal tenderness. There is no guarding.   Musculoskeletal:         General: No deformity or edema. Normal range of motion.      Cervical back: Normal range of motion.   Skin:     General: Skin is warm.   Neurological:      Mental Status: She is alert and oriented to person, place, and time.         The history was obtained from the review of the chart, patient.    Lab Results:   Lab Results   Component Value Date/Time    TSH 3RD GENERATON 0.019 (L) 04/01/2024 09:04 AM    TSH 3RD GENERATON <0.010 (L) 01/15/2024 07:39 AM    TSH 3RD GENERATON <0.010 (L) 10/17/2023 08:19 AM    Free T4 0.56 (L) 04/01/2024 09:04 AM    Free T4 0.73 01/15/2024 07:39 AM    Free T4 1.20 (H) 10/17/2023 08:19 AM       Imaging Studies:   Results for orders placed during the hospital encounter of 08/25/23    US thyroid    Impression  Multinodular goiter, as above. T4 nodules, 2.9 cm in the right thyroid lobe and 3.3 cm in the left thyroid lobe. Recommend further evaluation with FNA.        Reference: ACR Thyroid Imaging, Reporting and Data System (TI-RADS): White Paper of the ACR TI-RADS Committee. J AM Matt Radiol 2017;14:587-595. (additional recommendations based on American Thyroid Association 2015 guidelines.)      Workstation performed: [id number]      I have personally reviewed pertinent reports.      Portions of the record may have been created with voice recognition software. Occasional wrong word or \"sound a like\" substitutions may have occurred due to the inherent limitations of voice recognition " software. Read the chart carefully and recognize, using context, where substitutions have occurred.

## 2024-07-10 NOTE — ASSESSMENT & PLAN NOTE
Right : 1.8 x 1.2 x 0.7 cm upper posterior, solid hypoechoic   2.9 x 1.7 x 2 cm, interpolar, solid, hypoechoic, with central thick calcifications  0.8 x 0.7 x 0.8 cm, lower pole, solid hypoechoic with central large calcifications     Left thyroid nodules:  2.3 x 1.9 x 1.5 cm, upper pole, solid and cystic  1.5 x 1.6 x 1 cm, interpolar, solid with large peripheral calcifications  3.3 x 2.8 x 2.4 cm, lower pole, mixed predominantly solid with large calcifications.     Isthmic nodule:  0.9 x 0.9 x 0.6 cm, hypoechoic cystic and solid.  2 nodules the criteria for biopsy, left lower 3.0 cm nodule and 2.9 cm right-sided nodule, however these 2 nodules are compatible with hyperfunctioning nodule (hot) which has low risk of malignancy, we deferred biopsy given hyperfunctioning of nodules,  The concerning features was calcification, although this is not typical calcification for cancer which is punctuate (previously called microcalcification), I may proceed with FNA biopsy, I ordered TFT, and would determine next management option pending the result.

## 2024-07-12 ENCOUNTER — APPOINTMENT (OUTPATIENT)
Dept: LAB | Facility: CLINIC | Age: 39
End: 2024-07-12
Payer: COMMERCIAL

## 2024-07-12 LAB
T4 FREE SERPL-MCNC: 0.95 NG/DL (ref 0.61–1.12)
TSH SERPL DL<=0.05 MIU/L-ACNC: <0.01 UIU/ML (ref 0.45–4.5)

## 2024-07-12 PROCEDURE — 36415 COLL VENOUS BLD VENIPUNCTURE: CPT | Performed by: STUDENT IN AN ORGANIZED HEALTH CARE EDUCATION/TRAINING PROGRAM

## 2024-07-12 PROCEDURE — 84439 ASSAY OF FREE THYROXINE: CPT | Performed by: STUDENT IN AN ORGANIZED HEALTH CARE EDUCATION/TRAINING PROGRAM

## 2024-07-12 PROCEDURE — 84443 ASSAY THYROID STIM HORMONE: CPT | Performed by: STUDENT IN AN ORGANIZED HEALTH CARE EDUCATION/TRAINING PROGRAM

## 2024-11-24 DIAGNOSIS — R00.2 PALPITATION: ICD-10-CM

## 2024-11-25 NOTE — TELEPHONE ENCOUNTER
Patient called to request a refill for their Metoprolol advised a refill was requested on 11/24 and is pending approval. Patient verbalized understanding and is in agreement.

## 2024-11-26 RX ORDER — METOPROLOL TARTRATE 50 MG
50 TABLET ORAL 2 TIMES DAILY
Qty: 180 TABLET | Refills: 1 | Status: SHIPPED | OUTPATIENT
Start: 2024-11-26

## 2025-01-20 DIAGNOSIS — E05.10 THYROID ADENOMA, TOXIC: ICD-10-CM

## 2025-01-23 DIAGNOSIS — E05.10 THYROID ADENOMA, TOXIC: ICD-10-CM

## 2025-01-23 RX ORDER — METHIMAZOLE 5 MG/1
TABLET ORAL
Qty: 135 TABLET | Refills: 0 | OUTPATIENT
Start: 2025-01-23

## 2025-01-23 RX ORDER — METHIMAZOLE 5 MG/1
5 TABLET ORAL DAILY
Qty: 90 TABLET | Refills: 0 | Status: SHIPPED | OUTPATIENT
Start: 2025-01-23

## 2025-05-04 DIAGNOSIS — R00.2 PALPITATION: ICD-10-CM

## 2025-05-06 RX ORDER — METOPROLOL TARTRATE 50 MG
50 TABLET ORAL 2 TIMES DAILY
Qty: 180 TABLET | Refills: 1 | Status: SHIPPED | OUTPATIENT
Start: 2025-05-06

## 2025-05-28 ENCOUNTER — OFFICE VISIT (OUTPATIENT)
Dept: CARDIOLOGY CLINIC | Facility: CLINIC | Age: 40
End: 2025-05-28
Payer: COMMERCIAL

## 2025-05-28 VITALS
BODY MASS INDEX: 32.73 KG/M2 | SYSTOLIC BLOOD PRESSURE: 140 MMHG | DIASTOLIC BLOOD PRESSURE: 80 MMHG | HEART RATE: 80 BPM | WEIGHT: 221 LBS | HEIGHT: 69 IN

## 2025-05-28 DIAGNOSIS — I47.29 NSVT (NONSUSTAINED VENTRICULAR TACHYCARDIA) (HCC): Primary | ICD-10-CM

## 2025-05-28 DIAGNOSIS — R00.2 PALPITATIONS: ICD-10-CM

## 2025-05-28 DIAGNOSIS — I10 BENIGN ESSENTIAL HTN: ICD-10-CM

## 2025-05-28 PROCEDURE — 99214 OFFICE O/P EST MOD 30 MIN: CPT | Performed by: INTERNAL MEDICINE

## 2025-05-28 PROCEDURE — 93000 ELECTROCARDIOGRAM COMPLETE: CPT | Performed by: INTERNAL MEDICINE

## 2025-05-28 NOTE — PROGRESS NOTES
" Patient ID: Renata Alarcon is a 40 y.o. female.        Plan:      Assessment & Plan  NSVT (nonsustained ventricular tachycardia) (HCC)  RVOT.  Better on beta blocker.  Normal stress test and echo.  Also may be better as thyroid has been treated.    Benign essential HTN  A little bit high today but has been well-controlled at home.  Palpitations  Resolved.      Follow up Plan/Other summary comments:  Return in about 1 year (around 5/28/2026).    HPI: Patient is seen in follow-up today regarding the above.  Since last visit she has done well.  Methimazole is down to 2 days a week.  She continues to follow-up with my endocrine colleague in this regard.  No chest pain.  No sense of palpitations.  Certainly no syncope or near syncope.    To reiterate patient had been found to have right ventricular outflow tract ventricular tachycardia coinciding with hyperthyroidism in 2023.  Dysrhythmia responded well to beta-blocker and even better to concomitant treatment of thyroid disorder.      Results for orders placed or performed in visit on 05/28/25   POCT ECG    Impression    Sinus rhythm at 80 bpm.  Normal.         Most recent or relevant cardiac/vascular testing:    Stress echo 7/24/2023: Normal LV and RV systolic function.  PVCs at rest and with stress but no runs.  No ischemia or worsening dysrhythmia.      Past Surgical History[1]    Lipid Profile: Reviewed      Review of Systems   10  point ROS  was otherwise non pertinent or negative except as per HPI or as below.   Gait:  Normal.        Objective:     /80   Pulse 80   Ht 5' 9\" (1.753 m)   Wt 100 kg (221 lb)   BMI 32.64 kg/m²     PHYSICAL EXAM:    General:  Normal appearance in no distress.  Eyes:  Anicteric.  Oral mucosa:  Moist.  Neck:  No JVD. Carotid upstrokes are brisk without bruits.  No masses.  Chest:  Clear to auscultation.  Cardiac:  No palpable PMI.  Normal S1 and S2.  No murmur gallop or rub.  Abdomen:  Soft and nontender. No palpable organomegaly " or aortic enlargement.  Extremities:  No peripheral edema.  Musculoskeletal:  Symmetric.   Vascular:  Femoral pulses are brisk without bruits.  Popliteal pulses are intact bilaterally.   Pedal pulses are intact.  Neuro:  Grossly symmetric.  Psych:  Alert and oriented x3.      Meds reviewed.    Past Medical History[2]        Tobacco Use History[3]               [1]   Past Surgical History:  Procedure Laterality Date     SECTION     [2]   Past Medical History:  Diagnosis Date    NSVT (nonsustained ventricular tachycardia) ([facility])    [3]   Social History  Tobacco Use   Smoking Status Never    Passive exposure: Never   Smokeless Tobacco Never

## 2025-06-25 ENCOUNTER — OFFICE VISIT (OUTPATIENT)
Dept: ENDOCRINOLOGY | Facility: CLINIC | Age: 40
End: 2025-06-25
Payer: COMMERCIAL

## 2025-06-25 ENCOUNTER — APPOINTMENT (OUTPATIENT)
Dept: LAB | Facility: CLINIC | Age: 40
End: 2025-06-25
Payer: COMMERCIAL

## 2025-06-25 VITALS
RESPIRATION RATE: 16 BRPM | HEIGHT: 69 IN | BODY MASS INDEX: 32.5 KG/M2 | TEMPERATURE: 97.6 F | HEART RATE: 69 BPM | DIASTOLIC BLOOD PRESSURE: 82 MMHG | SYSTOLIC BLOOD PRESSURE: 122 MMHG | OXYGEN SATURATION: 99 % | WEIGHT: 219.4 LBS

## 2025-06-25 DIAGNOSIS — E05.90 HYPERTHYROIDISM: ICD-10-CM

## 2025-06-25 DIAGNOSIS — E04.2 MULTIPLE THYROID NODULES: ICD-10-CM

## 2025-06-25 DIAGNOSIS — E05.20 TOXIC MULTINODULAR GOITER: Primary | ICD-10-CM

## 2025-06-25 LAB
T4 FREE SERPL-MCNC: 1.35 NG/DL (ref 0.61–1.12)
TSH SERPL DL<=0.05 MIU/L-ACNC: <0.01 UIU/ML (ref 0.45–4.5)

## 2025-06-25 PROCEDURE — 99214 OFFICE O/P EST MOD 30 MIN: CPT | Performed by: STUDENT IN AN ORGANIZED HEALTH CARE EDUCATION/TRAINING PROGRAM

## 2025-06-25 PROCEDURE — 84439 ASSAY OF FREE THYROXINE: CPT | Performed by: STUDENT IN AN ORGANIZED HEALTH CARE EDUCATION/TRAINING PROGRAM

## 2025-06-25 PROCEDURE — 84443 ASSAY THYROID STIM HORMONE: CPT | Performed by: STUDENT IN AN ORGANIZED HEALTH CARE EDUCATION/TRAINING PROGRAM

## 2025-06-25 PROCEDURE — 36415 COLL VENOUS BLD VENIPUNCTURE: CPT | Performed by: STUDENT IN AN ORGANIZED HEALTH CARE EDUCATION/TRAINING PROGRAM

## 2025-06-25 NOTE — ASSESSMENT & PLAN NOTE
She was evaluated for NSVT, but was admitted in the hospital, was found to have hyperthyroidism, further evaluation revealed negative antibody for Graves', thyroid ultrasound showed multiple thyroid nodule which subsequently thyroid uptake and scan performed and showed hyperfunctioning nodule in your right mid and left lower thyroid lobes, different treatment options including radioiodine ablation, thyroidectomy and antithyroid medication discussed, and she opted to pursue the antithyroid medication, currently taking methimazole 5 mg just twice a week which she decreases by her own as she was experiencing fatigue, she has not done any TFTs since last year, which was ordered to decide regarding continuation of methimazole.    Orders:  •  US thyroid

## 2025-06-25 NOTE — ASSESSMENT & PLAN NOTE
Thyroid ultrasound showed 1.8 x 1.2 x 0.7 cm upper posterior solid hypoechoic nodule with cystic component, 2.9 x 1.7 x 2 cm, interpolar solid hypoechoic nodule with calcifications, 0.8 x 0.7 x 0.8 cm lower pole hypoechoic with central large calcification, left thyroid nodule, 2.3 x 1.9 x 1.5 cm upper pole with solid and cystic, 1.5 x 1.6 x 1 centimeter interpolar solid with large peripheral calcification and 3.3 by 2.8 x 2.4 cm lower lobe pole, mixed predominantly solid with large calcification.  2.9 cm right interpolar thyroid nodule and 3.3 cm left-sided thyroid nodule was recommended for biopsy although given the fact that thyroid uptake and scan showed hyperfunctioning (hot) nodule, deferred biopsy as it is called to benign biopsy.  She has no compressive symptoms, no history of radiation to her neck, and no family history of thyroid cancer.  Thyroid ultrasound ordered for further evaluation.

## 2025-06-25 NOTE — PROGRESS NOTES
Name: Renata Alarcon      : 1985      MRN: [id number]  Encounter Provider: Miguel Barrera MD  Encounter Date: 2025   Encounter department: [facility] [place]    Chief Complaint   Patient presents with   • Follow-up   :  Assessment & Plan  Toxic multinodular goiter  She was evaluated for NSVT, but was admitted in the hospital, was found to have hyperthyroidism, further evaluation revealed negative antibody for Graves', thyroid ultrasound showed multiple thyroid nodule which subsequently thyroid uptake and scan performed and showed hyperfunctioning nodule in your right mid and left lower thyroid lobes, different treatment options including radioiodine ablation, thyroidectomy and antithyroid medication discussed, and she opted to pursue the antithyroid medication, currently taking methimazole 5 mg just twice a week which she decreases by her own as she was experiencing fatigue, she has not done any TFTs since last year, which was ordered to decide regarding continuation of methimazole.    Orders:  •  US thyroid    Hyperthyroidism  See plan for toxic multinodular goiter.  Orders:  •  TSH, 3rd generation with Free T4 reflex    Multiple thyroid nodules  Thyroid ultrasound showed 1.8 x 1.2 x 0.7 cm upper posterior solid hypoechoic nodule with cystic component, 2.9 x 1.7 x 2 cm, interpolar solid hypoechoic nodule with calcifications, 0.8 x 0.7 x 0.8 cm lower pole hypoechoic with central large calcification, left thyroid nodule, 2.3 x 1.9 x 1.5 cm upper pole with solid and cystic, 1.5 x 1.6 x 1 centimeter interpolar solid with large peripheral calcification and 3.3 by 2.8 x 2.4 cm lower lobe pole, mixed predominantly solid with large calcification.  2.9 cm right interpolar thyroid nodule and 3.3 cm left-sided thyroid nodule was recommended for biopsy although given the fact that thyroid uptake and scan showed hyperfunctioning (hot) nodule, deferred biopsy as it is called to  benign biopsy.  She has no compressive symptoms, no history of radiation to her neck, and no family history of thyroid cancer.  Thyroid ultrasound ordered for further evaluation.               Pertinent Medical History           History of Present Illness   History of Present Illness    Renata Alarcon is a 40 y.o. female who presented for follow-up for hyperthyroidism due to toxic multinodular goiter and multiple thyroid nodules.    Her last visit was in July 2024, she has missed her last 2 visits.    She was initially seen i she was admitted NSVT.  To have hyperthyroidism.  Various causes for hyperthyroidism were evaluated, including Graves' disease, which was ruled out based on negative antibody results. An ultrasound revealed multiple thyroid nodulese. A thyroid uptake scan indicated hyperfunctioning nodules, which were identified as the cause of her symptoms.     She has been on a regimen of methimazole 5 mg twice weekly for the past 3 months, she decreased the dose on her own as she was feeling fatigue.  Which has resulted in an improvement in her symptoms. She reports no current symptoms of palpitations, tremors, shakiness, anxiety, nervousness, diarrhea, loose stools, or hair loss. Additionally, she does not experience any pressure on her neck, difficulty swallowing, or breathing issues. She has no history of radiation exposure to her head or neck, except for an x-ray. There is no family history of thyroid cancer. She is also on metoprolol 50 mg twice daily and is under the care of Dr. Lyles.    FAMILY HISTORY  She reports no family history of thyroid cancer.    Review of Systems as per HPI  Medical History Reviewed by provider this encounter:     .  Medications Ordered Prior to Encounter[1]      Medical History Reviewed by provider this encounter:     .    Objective   /82 (BP Location: Right arm, Patient Position: Sitting, Cuff Size: Large)   Pulse 69   Temp 97.6 °F (36.4 °C) (Temporal)   Resp 16   " Ht 5' 9\" (1.753 m)   Wt 99.5 kg (219 lb 6.4 oz)   SpO2 99%   BMI 32.40 kg/m²      Body mass index is 32.4 kg/m².  Wt Readings from Last 3 Encounters:   06/25/25 99.5 kg (219 lb 6.4 oz)   05/28/25 100 kg (221 lb)   07/10/24 99.5 kg (219 lb 6.4 oz)     Physical Exam  Physical Exam  General: Patient appears well and in no acute distress.  Head and Neck: Thyroid examination reveals multiple nodules, which are not firm or fixed.    Results    Labs: I have reviewed pertinent labs including:   Lab Results   Component Value Date    [id number] <0.010 (L) 07/12/2024      Lab Results   Component Value Date    FREET4 0.95 07/12/2024      THYROID ULTRASOUND     INDICATION:    E05.90: Thyrotoxicosis, unspecified without thyrotoxic crisis or storm.     COMPARISON:  None     TECHNIQUE:   Ultrasound of the thyroid was performed with a high frequency linear transducer in transverse and sagittal planes including volumetric imaging sweeps as well as traditional still imaging technique.     FINDINGS:  Normal homogeneous smooth echotexture.     Right lobe: 7.0 x 2.7 x 2.3 cm. Volume 20.9 mL  Left lobe:  7.2 x 2.9 x 2.6 cm. Volume 25.8 mL  Isthmus: 0.6  cm.     Right thyroid nodules:  1.8 x 1.2 x 0.7 cm upper posterior, solid hypoechoic with small cystic components and linear bright reflectors producing ringdown artifact  2.9 x 1.7 x 2 cm, interpolar, solid, hypoechoic, with central thick calcifications  0.8 x 0.7 x 0.8 cm, lower pole, solid hypoechoic with central large calcifications     Left thyroid nodules:  2.3 x 1.9 x 1.5 cm, upper pole, solid and cystic  1.5 x 1.6 x 1 cm, interpolar, solid with large peripheral calcifications  3.3 x 2.8 x 2.4 cm, lower pole, mixed predominantly solid with large calcifications.     Isthmic nodule:  0.9 x 0.9 x 0.6 cm, hypoechoic cystic and solid     IMPRESSION:     Multinodular goiter, as above. T4 nodules, 2.9 cm in the right thyroid lobe and 3.3 cm in the left thyroid lobe. Recommend " further evaluation with FNA.           Reference: ACR Thyroid Imaging, Reporting and Data System (TI-RADS): White Paper of the ACR TI-RADS Committee. J AM Matt Radiol 2017;14:587-595. (additional recommendations based on American Thyroid Association 2015 guidelines.)        Workstation performed: [id number]       THYROID SCAN AND UPTAKE     INDICATION: Multiple thyroid nodules with hyperthyroidism. Rule out toxic adenoma. []     COMPARISON: No nuclear studies available. Thyroid ultrasound 8/25/2023.     TECHNIQUE:  The study was performed following the oral administration of 0.28 uCi I-123.     FINDINGS:     The thyroid scan demonstrates increased uptake of the radiopharmaceutical in the left lower lobe and right middle lobe/interpolar region with decreased uptake in the remaining thyroid gland. Most noticeably there is decreased uptake of tracer in the   right lower lobe, and to a lesser extent, the left upper lobe. These findings are likely indicative of underlying hot/hyperfunctioning adenoma locations. This coincides with the location of the two largest nodules seen on thyroid ultrasound performed   8/25/2023.     Thyroid uptake values are:     6 hours:   28% (N =  5 -15%)     24 hours: 41% (N =15 - 35%)     IMPRESSION:     1. Hyperfunctioning nodules in the right mid and left lower thyroid lobes.  2. Increased thyroid uptake at both 6 and 24 hours.        Resident: EDDIE MARTÍNEZ I, the attending radiologist, have reviewed the images and agree with the final report above.     Workstation performed: [id number]        There are no Patient Instructions on file for this visit.    Discussed with the patient and all questioned fully answered. She will call me if any problems arise.             [1]  Current Outpatient Medications on File Prior to Visit   Medication Sig Dispense Refill   • methimazole (TAPAZOLE) 5 mg tablet Take 1 tablet (5 mg total) by mouth in the morning 90 tablet 0   • metoprolol tartrate  (LOPRESSOR) 50 mg tablet take 1 tablet by mouth twice a day 180 tablet 1     No current facility-administered medications on file prior to visit.

## 2025-07-17 ENCOUNTER — HOSPITAL ENCOUNTER (OUTPATIENT)
Dept: ULTRASOUND IMAGING | Facility: HOSPITAL | Age: 40
End: 2025-07-17
Attending: STUDENT IN AN ORGANIZED HEALTH CARE EDUCATION/TRAINING PROGRAM
Payer: COMMERCIAL

## 2025-07-17 PROCEDURE — 76536 US EXAM OF HEAD AND NECK: CPT

## 2025-07-28 DIAGNOSIS — E05.10 THYROID ADENOMA, TOXIC: ICD-10-CM

## 2025-07-28 DIAGNOSIS — R00.2 PALPITATION: ICD-10-CM

## 2025-07-30 ENCOUNTER — TELEPHONE (OUTPATIENT)
Dept: ENDOCRINOLOGY | Facility: CLINIC | Age: 40
End: 2025-07-30

## 2025-07-30 DIAGNOSIS — E04.2 MULTIPLE THYROID NODULES: Primary | ICD-10-CM

## 2025-07-30 RX ORDER — METHIMAZOLE 5 MG/1
5 TABLET ORAL DAILY
Qty: 90 TABLET | Refills: 0 | Status: SHIPPED | OUTPATIENT
Start: 2025-07-30

## 2025-08-20 ENCOUNTER — TELEPHONE (OUTPATIENT)
Dept: RADIOLOGY | Facility: HOSPITAL | Age: 40
End: 2025-08-20

## 2025-08-21 ENCOUNTER — TELEPHONE (OUTPATIENT)
Age: 40
End: 2025-08-21

## 2025-08-22 ENCOUNTER — HOSPITAL ENCOUNTER (OUTPATIENT)
Dept: ULTRASOUND IMAGING | Facility: HOSPITAL | Age: 40
Discharge: HOME | End: 2025-08-22
Attending: STUDENT IN AN ORGANIZED HEALTH CARE EDUCATION/TRAINING PROGRAM
Payer: COMMERCIAL

## 2025-08-22 DIAGNOSIS — E04.2 MULTIPLE THYROID NODULES: ICD-10-CM

## 2025-08-22 PROCEDURE — 88173 CYTOPATH EVAL FNA REPORT: CPT | Performed by: PATHOLOGY

## 2025-08-22 PROCEDURE — 10005 FNA BX W/US GDN 1ST LES: CPT

## 2025-08-22 PROCEDURE — 10006 FNA BX W/US GDN EA ADDL: CPT

## 2025-08-22 RX ORDER — LIDOCAINE HYDROCHLORIDE 10 MG/ML
5 INJECTION, SOLUTION EPIDURAL; INFILTRATION; INTRACAUDAL; PERINEURAL ONCE
Status: COMPLETED | OUTPATIENT
Start: 2025-08-22 | End: 2025-08-22

## 2025-08-22 RX ORDER — LIDOCAINE HYDROCHLORIDE 10 MG/ML
10 INJECTION, SOLUTION EPIDURAL; INFILTRATION; INTRACAUDAL; PERINEURAL ONCE
Status: DISCONTINUED | OUTPATIENT
Start: 2025-08-22 | End: 2025-08-22 | Stop reason: DRUGHIGH

## 2025-08-22 RX ADMIN — LIDOCAINE HYDROCHLORIDE 5 ML: 10 INJECTION, SOLUTION EPIDURAL; INFILTRATION; INTRACAUDAL at 08:45
